# Patient Record
Sex: FEMALE | Race: WHITE | NOT HISPANIC OR LATINO | Employment: UNEMPLOYED | URBAN - NONMETROPOLITAN AREA
[De-identification: names, ages, dates, MRNs, and addresses within clinical notes are randomized per-mention and may not be internally consistent; named-entity substitution may affect disease eponyms.]

---
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
## 2017-01-11 ENCOUNTER — ROUTINE PRENATAL (OUTPATIENT)
Dept: OBSTETRICS AND GYNECOLOGY | Facility: CLINIC | Age: 22
End: 2017-01-11

## 2017-01-11 VITALS — WEIGHT: 171 LBS | SYSTOLIC BLOOD PRESSURE: 104 MMHG | BODY MASS INDEX: 32.31 KG/M2 | DIASTOLIC BLOOD PRESSURE: 62 MMHG

## 2017-01-11 DIAGNOSIS — Z34.82 ENCOUNTER FOR SUPERVISION OF OTHER NORMAL PREGNANCY IN SECOND TRIMESTER: Primary | ICD-10-CM

## 2017-01-11 PROBLEM — Z34.92 ENCOUNTER FOR SUPERVISION OF NORMAL PREGNANCY IN SECOND TRIMESTER: Status: ACTIVE | Noted: 2017-01-11

## 2017-01-11 PROCEDURE — 99213 OFFICE O/P EST LOW 20 MIN: CPT | Performed by: OBSTETRICS & GYNECOLOGY

## 2017-01-11 NOTE — MR AVS SNAPSHOT
Maida D Day   2017 9:30 AM   Routine Prenatal    Dept Phone:  [phone]   Encounter #:  [id number]    Provider:  Frantz Gonzales MD   Department:  [facility]                Your Full Care Plan              Your Updated Medication List          This list is accurate as of: 17 10:03 AM.  Always use your most recent med list.                PRENATAL 1 PO               We Performed the Following     Maternal Screen 4       You Were Diagnosed With        Codes Comments    Encounter for supervision of other normal pregnancy in second trimester    -  Primary ICD-10-CM: Z34.82       Instructions     None    Patient Instructions History      Upcoming Appointments     Visit Type Date Time Department    OB FOLLOWUP 2017  9:30 AM [facility]    OB FOLLOWUP 2017  9:15 AM [facility] [place] [facility]      [employer] Signup     [facility] [employer] allows you to send messages to your doctor, view your test results, renew your prescriptions, schedule appointments, and more. To sign up, go to [employer] and click on the Sign Up Now link in the New User? box. Enter your [employer] Activation Code exactly as it appears below along with the last four digits of your Social Security Number and your Date of Birth () to complete the sign-up process. If you do not sign up before the expiration date, you must request a new code.    [employer] Activation Code: [email or web address]  Expires: 2017 10:45 AM    If you have questions, you can email [email or web address] or call [phone] to talk to our [employer] staff. Remember, [employer] is NOT to be used for urgent needs. For medical emergencies, dial 911.               Other Info from Your Visit           Your Appointments     2017  9:15 AM CST   OB FOLLOWUP with Tess Cruz MD   [facility] (--)    [address] [place] [id number]   [phone]             Allergies     No Known Allergies      Reason for Visit     Routine Prenatal Visit           Vital Signs     Blood Pressure Weight Last Menstrual Period Body Mass Index Smoking Status       104/62 (43 %/ 52 %)* 171 lb (77.6 kg) (92 %, Z= 1.40)† 09/13/2016 32.31 kg/m2 (95 %, Z= 1.69)† Former Smoker     *BP percentiles are based on NHBPEP's 4th Report    †Growth percentiles are based on CDC 2-20 Years data.      Problems and Diagnoses Noted     Prenatal care

## 2017-01-13 LAB
2ND TRIMESTER 4 SCREEN SERPL-IMP: NORMAL
2ND TRIMESTER 4 SCREEN SERPL-IMP: NORMAL
AFP ADJ MOM SERPL: 1.26
AFP SERPL-MCNC: 42.9 NG/ML
AGE AT DELIVERY: 20.3 YEARS
FET TS 18 RISK FROM MAT AGE: NORMAL
FET TS 21 RISK FROM MAT AGE: 1159
GA METHOD: NORMAL
GA: 17.1 WEEKS
HCG ADJ MOM SERPL: 0.32
HCG SERPL-ACNC: 9395 MIU/ML
IDDM PATIENT QL: NO
INHIBIN A ADJ MOM SERPL: 0.63
INHIBIN A SERPL-MCNC: 100.59 PG/ML
LABORATORY COMMENT REPORT: NORMAL
Lab: NORMAL
MULTIPLE PREGNANCY: NO
NEURAL TUBE DEFECT RISK FETUS: 5411 %
RESULT: NORMAL
TS 18 RISK FETUS: NORMAL
TS 21 RISK FETUS: NORMAL
U ESTRIOL ADJ MOM SERPL: 0.82
U ESTRIOL SERPL-MCNC: 0.9 NG/ML

## 2017-02-08 ENCOUNTER — ROUTINE PRENATAL (OUTPATIENT)
Dept: OBSTETRICS AND GYNECOLOGY | Facility: CLINIC | Age: 22
End: 2017-02-08

## 2017-02-08 VITALS — BODY MASS INDEX: 32.5 KG/M2 | DIASTOLIC BLOOD PRESSURE: 70 MMHG | WEIGHT: 172 LBS | SYSTOLIC BLOOD PRESSURE: 108 MMHG

## 2017-02-08 DIAGNOSIS — Z78.9 NONSMOKER: Primary | ICD-10-CM

## 2017-02-08 DIAGNOSIS — Z34.82 PRENATAL CARE, SUBSEQUENT PREGNANCY, SECOND TRIMESTER: ICD-10-CM

## 2017-02-08 PROCEDURE — 99212 OFFICE O/P EST SF 10 MIN: CPT | Performed by: OBSTETRICS & GYNECOLOGY

## 2017-02-08 NOTE — PROGRESS NOTES
Nonsmoker.  Patient feeling better, HA's and dizziness are both gone.  No problems or concerns.  Patient has to go back to [facility] to finish anatomy scan because there were structures not visualized well (no report available yet).

## 2017-03-08 ENCOUNTER — ROUTINE PRENATAL (OUTPATIENT)
Dept: OBSTETRICS AND GYNECOLOGY | Facility: CLINIC | Age: 22
End: 2017-03-08

## 2017-03-08 VITALS — BODY MASS INDEX: 32.69 KG/M2 | WEIGHT: 173 LBS | DIASTOLIC BLOOD PRESSURE: 70 MMHG | SYSTOLIC BLOOD PRESSURE: 128 MMHG

## 2017-03-08 DIAGNOSIS — Z34.82 PRENATAL CARE, SUBSEQUENT PREGNANCY, SECOND TRIMESTER: ICD-10-CM

## 2017-03-08 DIAGNOSIS — Z78.9 NONSMOKER: Primary | ICD-10-CM

## 2017-03-08 PROCEDURE — 99212 OFFICE O/P EST SF 10 MIN: CPT | Performed by: OBSTETRICS & GYNECOLOGY

## 2017-03-08 NOTE — PROGRESS NOTES
Occasional cramping, never any bleeding.  Good FM.  Pt reports remainder of MFM scan was normal.  GCT and TDaP next visit.

## 2017-03-29 ENCOUNTER — ROUTINE PRENATAL (OUTPATIENT)
Dept: OBSTETRICS AND GYNECOLOGY | Facility: CLINIC | Age: 22
End: 2017-03-29

## 2017-03-29 VITALS — SYSTOLIC BLOOD PRESSURE: 130 MMHG | DIASTOLIC BLOOD PRESSURE: 70 MMHG | BODY MASS INDEX: 33.82 KG/M2 | WEIGHT: 179 LBS

## 2017-03-29 DIAGNOSIS — Z78.9 NONSMOKER: Primary | ICD-10-CM

## 2017-03-29 DIAGNOSIS — Z34.82 PRENATAL CARE, SUBSEQUENT PREGNANCY, SECOND TRIMESTER: ICD-10-CM

## 2017-03-29 LAB
GLUCOSE 1H P 50 G GLC PO SERPL-MCNC: 85 MG/DL (ref 70–140)
HGB BLD-MCNC: 11.2 G/DL (ref 12–16)

## 2017-03-29 PROCEDURE — 99212 OFFICE O/P EST SF 10 MIN: CPT | Performed by: OBSTETRICS & GYNECOLOGY

## 2017-03-29 PROCEDURE — 90715 TDAP VACCINE 7 YRS/> IM: CPT | Performed by: OBSTETRICS & GYNECOLOGY

## 2017-03-29 PROCEDURE — 90471 IMMUNIZATION ADMIN: CPT | Performed by: OBSTETRICS & GYNECOLOGY

## 2017-03-29 NOTE — PROGRESS NOTES
Pt c/o swelling in hands and feet.  She also reports feeling a little more tired than usual.  Good FM.  GCT & TDaP today.

## 2017-04-12 ENCOUNTER — ROUTINE PRENATAL (OUTPATIENT)
Dept: OBSTETRICS AND GYNECOLOGY | Facility: CLINIC | Age: 22
End: 2017-04-12

## 2017-04-12 VITALS — DIASTOLIC BLOOD PRESSURE: 70 MMHG | SYSTOLIC BLOOD PRESSURE: 120 MMHG | BODY MASS INDEX: 34.2 KG/M2 | WEIGHT: 181 LBS

## 2017-04-12 DIAGNOSIS — Z34.93 PRENATAL CARE, THIRD TRIMESTER: Primary | ICD-10-CM

## 2017-04-12 PROCEDURE — 99213 OFFICE O/P EST LOW 20 MIN: CPT | Performed by: NURSE PRACTITIONER

## 2017-04-12 NOTE — PATIENT INSTRUCTIONS
"Third Trimester of Pregnancy  The third trimester is from week 29 through week 42, months 7 through 9. The third trimester is a time when the fetus is growing rapidly. At the end of the ninth month, the fetus is about 20 inches in length and weighs 6-10 pounds.   BODY CHANGES  Your body goes through many changes during pregnancy. The changes vary from woman to woman.   · Your weight will continue to increase. You can expect to gain 25-35 pounds (11-16 kg) by the end of the pregnancy.  · You may begin to get stretch marks on your hips, abdomen, and breasts.  · You may urinate more often because the fetus is moving lower into your pelvis and pressing on your bladder.  · You may develop or continue to have heartburn as a result of your pregnancy.  · You may develop constipation because certain hormones are causing the muscles that push waste through your intestines to slow down.  · You may develop hemorrhoids or swollen, bulging veins (varicose veins).  · You may have pelvic pain because of the weight gain and pregnancy hormones relaxing your joints between the bones in your pelvis. Backaches may result from overexertion of the muscles supporting your posture.  · You may have changes in your hair. These can include thickening of your hair, rapid growth, and changes in texture. Some women also have hair loss during or after pregnancy, or hair that feels dry or thin. Your hair will most likely return to normal after your baby is born.  · Your breasts will continue to grow and be tender. A yellow discharge may leak from your breasts called colostrum.  · Your belly button may stick out.  · You may feel short of breath because of your expanding uterus.  · You may notice the fetus \"dropping,\" or moving lower in your abdomen.  · You may have a bloody mucus discharge. This usually occurs a few days to a week before labor begins.  · Your cervix becomes thin and soft (effaced) near your due date.  WHAT TO EXPECT AT YOUR PRENATAL " EXAMS   You will have prenatal exams every 2 weeks until week 36. Then, you will have weekly prenatal exams. During a routine prenatal visit:  · You will be weighed to make sure you and the fetus are growing normally.  · Your blood pressure is taken.  · Your abdomen will be measured to track your baby's growth.  · The fetal heartbeat will be listened to.  · Any test results from the previous visit will be discussed.  · You may have a cervical check near your due date to see if you have effaced.  At around 36 weeks, your caregiver will check your cervix. At the same time, your caregiver will also perform a test on the secretions of the vaginal tissue. This test is to determine if a type of bacteria, Group B streptococcus, is present. Your caregiver will explain this further.  Your caregiver may ask you:  · What your birth plan is.  · How you are feeling.  · If you are feeling the baby move.  · If you have had any abnormal symptoms, such as leaking fluid, bleeding, severe headaches, or abdominal cramping.  · If you are using any tobacco products, including cigarettes, chewing tobacco, and electronic cigarettes.  · If you have any questions.  Other tests or screenings that may be performed during your third trimester include:  · Blood tests that check for low iron levels (anemia).  · Fetal testing to check the health, activity level, and growth of the fetus. Testing is done if you have certain medical conditions or if there are problems during the pregnancy.  · HIV (human immunodeficiency virus) testing. If you are at high risk, you may be screened for HIV during your third trimester of pregnancy.  FALSE LABOR  You may feel small, irregular contractions that eventually go away. These are called Dayday Hyman contractions, or false labor. Contractions may last for hours, days, or even weeks before true labor sets in. If contractions come at regular intervals, intensify, or become painful, it is best to be seen by your  caregiver.   SIGNS OF LABOR   · Menstrual-like cramps.  · Contractions that are 5 minutes apart or less.  · Contractions that start on the top of the uterus and spread down to the lower abdomen and back.  · A sense of increased pelvic pressure or back pain.  · A watery or bloody mucus discharge that comes from the vagina.  If you have any of these signs before the 37th week of pregnancy, call your caregiver right away. You need to go to the hospital to get checked immediately.  HOME CARE INSTRUCTIONS   · Avoid all smoking, herbs, alcohol, and unprescribed drugs. These chemicals affect the formation and growth of the baby.  · Do not use any tobacco products, including cigarettes, chewing tobacco, and electronic cigarettes. If you need help quitting, ask your health care provider. You may receive counseling support and other resources to help you quit.  · Follow your caregiver's instructions regarding medicine use. There are medicines that are either safe or unsafe to take during pregnancy.  · Exercise only as directed by your caregiver. Experiencing uterine cramps is a good sign to stop exercising.  · Continue to eat regular, healthy meals.  · Wear a good support bra for breast tenderness.  · Do not use hot tubs, steam rooms, or saunas.  · Wear your seat belt at all times when driving.  · Avoid raw meat, uncooked cheese, cat litter boxes, and soil used by cats. These carry germs that can cause birth defects in the baby.  · Take your prenatal vitamins.  · Take 1874-7777 mg of calcium daily starting at the 20th week of pregnancy until you deliver your baby.  · Try taking a stool softener (if your caregiver approves) if you develop constipation. Eat more high-fiber foods, such as fresh vegetables or fruit and whole grains. Drink plenty of fluids to keep your urine clear or pale yellow.  · Take warm sitz baths to soothe any pain or discomfort caused by hemorrhoids. Use hemorrhoid cream if your caregiver approves.  · If  you develop varicose veins, wear support hose. Elevate your feet for 15 minutes, 3-4 times a day. Limit salt in your diet.  · Avoid heavy lifting, wear low heal shoes, and practice good posture.  · Rest a lot with your legs elevated if you have leg cramps or low back pain.  · Visit your dentist if you have not gone during your pregnancy. Use a soft toothbrush to brush your teeth and be gentle when you floss.  · A sexual relationship may be continued unless your caregiver directs you otherwise.  · Do not travel far distances unless it is absolutely necessary and only with the approval of your caregiver.  · Take prenatal classes to understand, practice, and ask questions about the labor and delivery.  · Make a trial run to the hospital.  · Pack your hospital bag.  · Prepare the baby's nursery.  · Continue to go to all your prenatal visits as directed by your caregiver.  SEEK MEDICAL CARE IF:  · You are unsure if you are in labor or if your water has broken.  · You have dizziness.  · You have mild pelvic cramps, pelvic pressure, or nagging pain in your abdominal area.  · You have persistent nausea, vomiting, or diarrhea.  · You have a bad smelling vaginal discharge.  · You have pain with urination.  SEEK IMMEDIATE MEDICAL CARE IF:   · You have a fever.  · You are leaking fluid from your vagina.  · You have spotting or bleeding from your vagina.  · You have severe abdominal cramping or pain.  · You have rapid weight loss or gain.  · You have shortness of breath with chest pain.  · You notice sudden or extreme swelling of your face, hands, ankles, feet, or legs.  · You have not felt your baby move in over an hour.  · You have severe headaches that do not go away with medicine.  · You have vision changes.     This information is not intended to replace advice given to you by your health care provider. Make sure you discuss any questions you have with your health care provider.     Document Released: 12/12/2002 Document  Revised: 01/08/2016 Document Reviewed: 02/18/2014  Elsevier Interactive Patient Education ©2016 Elsevier Inc.

## 2017-04-12 NOTE — PROGRESS NOTES
Reviewed previous labs Hg slightly low. Encouraged iron supplement. Occ contraction. No reactions to TDap.  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

## 2017-04-27 ENCOUNTER — ROUTINE PRENATAL (OUTPATIENT)
Dept: OBSTETRICS AND GYNECOLOGY | Facility: CLINIC | Age: 22
End: 2017-04-27

## 2017-04-27 VITALS — SYSTOLIC BLOOD PRESSURE: 120 MMHG | BODY MASS INDEX: 34.39 KG/M2 | WEIGHT: 182 LBS | DIASTOLIC BLOOD PRESSURE: 74 MMHG

## 2017-04-27 DIAGNOSIS — Z34.82 ENCOUNTER FOR SUPERVISION OF OTHER NORMAL PREGNANCY IN SECOND TRIMESTER: Primary | ICD-10-CM

## 2017-04-27 PROCEDURE — 99213 OFFICE O/P EST LOW 20 MIN: CPT | Performed by: OBSTETRICS & GYNECOLOGY

## 2017-04-27 NOTE — PROGRESS NOTES
Good fetal movement  Feeling well, no contractions  Abdomen nontender, no edema   labor precautions

## 2017-05-10 ENCOUNTER — ROUTINE PRENATAL (OUTPATIENT)
Dept: OBSTETRICS AND GYNECOLOGY | Facility: CLINIC | Age: 22
End: 2017-05-10

## 2017-05-10 VITALS — DIASTOLIC BLOOD PRESSURE: 70 MMHG | SYSTOLIC BLOOD PRESSURE: 115 MMHG | WEIGHT: 184 LBS | BODY MASS INDEX: 34.77 KG/M2

## 2017-05-10 DIAGNOSIS — Z34.93 PRENATAL CARE, THIRD TRIMESTER: Primary | ICD-10-CM

## 2017-05-10 DIAGNOSIS — Z78.9 NONSMOKER: ICD-10-CM

## 2017-05-10 PROCEDURE — 99213 OFFICE O/P EST LOW 20 MIN: CPT | Performed by: OBSTETRICS & GYNECOLOGY

## 2017-05-23 LAB
EXTERNAL CHLAMYDIA SCREEN: NEGATIVE
EXTERNAL GONORRHEA SCREEN: NEGATIVE

## 2017-05-24 ENCOUNTER — ROUTINE PRENATAL (OUTPATIENT)
Dept: OBSTETRICS AND GYNECOLOGY | Facility: CLINIC | Age: 22
End: 2017-05-24

## 2017-05-24 VITALS — BODY MASS INDEX: 35.52 KG/M2 | SYSTOLIC BLOOD PRESSURE: 120 MMHG | DIASTOLIC BLOOD PRESSURE: 82 MMHG | WEIGHT: 188 LBS

## 2017-05-24 DIAGNOSIS — Z34.83 PRENATAL CARE, SUBSEQUENT PREGNANCY, THIRD TRIMESTER: Primary | ICD-10-CM

## 2017-05-24 DIAGNOSIS — Z78.9 NONSMOKER: ICD-10-CM

## 2017-05-24 LAB — EXTERNAL GROUP B STREP ANTIGEN: NEGATIVE

## 2017-05-24 PROCEDURE — 99213 OFFICE O/P EST LOW 20 MIN: CPT | Performed by: OBSTETRICS & GYNECOLOGY

## 2017-05-26 LAB
C TRACH RRNA SPEC QL NAA+PROBE: NEGATIVE
GP B STREP DNA SPEC QL NAA+PROBE: NEGATIVE
N GONORRHOEA RRNA SPEC QL NAA+PROBE: NEGATIVE

## 2017-05-31 ENCOUNTER — ROUTINE PRENATAL (OUTPATIENT)
Dept: OBSTETRICS AND GYNECOLOGY | Facility: CLINIC | Age: 22
End: 2017-05-31

## 2017-05-31 VITALS — DIASTOLIC BLOOD PRESSURE: 70 MMHG | WEIGHT: 189 LBS | BODY MASS INDEX: 35.71 KG/M2 | SYSTOLIC BLOOD PRESSURE: 112 MMHG

## 2017-05-31 DIAGNOSIS — Z34.83 PRENATAL CARE, SUBSEQUENT PREGNANCY, THIRD TRIMESTER: Primary | ICD-10-CM

## 2017-05-31 DIAGNOSIS — Z78.9 NONSMOKER: ICD-10-CM

## 2017-05-31 PROCEDURE — 99212 OFFICE O/P EST SF 10 MIN: CPT | Performed by: OBSTETRICS & GYNECOLOGY

## 2017-06-07 ENCOUNTER — ROUTINE PRENATAL (OUTPATIENT)
Dept: OBSTETRICS AND GYNECOLOGY | Facility: CLINIC | Age: 22
End: 2017-06-07

## 2017-06-07 VITALS — DIASTOLIC BLOOD PRESSURE: 72 MMHG | SYSTOLIC BLOOD PRESSURE: 120 MMHG | WEIGHT: 188 LBS | BODY MASS INDEX: 35.52 KG/M2

## 2017-06-07 DIAGNOSIS — Z34.93 PRENATAL CARE, THIRD TRIMESTER: ICD-10-CM

## 2017-06-07 DIAGNOSIS — Z78.9 NONSMOKER: Primary | ICD-10-CM

## 2017-06-07 PROCEDURE — 99212 OFFICE O/P EST SF 10 MIN: CPT | Performed by: OBSTETRICS & GYNECOLOGY

## 2017-06-10 ENCOUNTER — HOSPITAL ENCOUNTER (INPATIENT)
Facility: HOSPITAL | Age: 22
LOS: 2 days | Discharge: HOME | End: 2017-06-12
Attending: OBSTETRICS & GYNECOLOGY | Admitting: OBSTETRICS & GYNECOLOGY

## 2017-06-10 PROBLEM — O42.92 FULL-TERM PREMATURE RUPTURE OF MEMBRANES: Status: ACTIVE | Noted: 2017-06-10

## 2017-06-10 PROBLEM — Z34.90 NORMAL PREGNANCY: Status: ACTIVE | Noted: 2017-06-10

## 2017-06-10 PROBLEM — Z37.9 NORMAL LABOR: Status: ACTIVE | Noted: 2017-06-10

## 2017-06-10 LAB
A1 MICROGLOB PLACENTAL VAG QL: NEGATIVE
A1 MICROGLOB PLACENTAL VAG QL: POSITIVE
ABO GROUP BLD: NORMAL
BLD GP AB SCN SERPL QL: NEGATIVE
DEPRECATED RDW RBC AUTO: 42.7 FL (ref 40–54)
ERYTHROCYTE [DISTWIDTH] IN BLOOD BY AUTOMATED COUNT: 14.6 % (ref 12–15)
HCT VFR BLD AUTO: 38.1 % (ref 37–47)
HGB BLD-MCNC: 12.2 G/DL (ref 12–16)
MCH RBC QN AUTO: 26 PG (ref 28–32)
MCHC RBC AUTO-ENTMCNC: 32 G/DL (ref 33–36)
MCV RBC AUTO: 81.2 FL (ref 82–98)
PLATELET # BLD AUTO: 224 10*3/MM3 (ref 130–400)
PMV BLD AUTO: 13.5 FL (ref 6–12)
RBC # BLD AUTO: 4.69 10*6/MM3 (ref 4.2–5.4)
RH BLD: POSITIVE
WBC NRBC COR # BLD: 10.52 10*3/MM3 (ref 4.8–10.8)

## 2017-06-10 PROCEDURE — 86850 RBC ANTIBODY SCREEN: CPT | Performed by: OBSTETRICS & GYNECOLOGY

## 2017-06-10 PROCEDURE — 84112 EVAL AMNIOTIC FLUID PROTEIN: CPT | Performed by: OBSTETRICS & GYNECOLOGY

## 2017-06-10 PROCEDURE — 25010000002 BUTORPHANOL PER 1 MG: Performed by: OBSTETRICS & GYNECOLOGY

## 2017-06-10 PROCEDURE — 86901 BLOOD TYPING SEROLOGIC RH(D): CPT | Performed by: OBSTETRICS & GYNECOLOGY

## 2017-06-10 PROCEDURE — 25010000002 PENICILLIN G POTASSIUM PER 600000 UNITS: Performed by: OBSTETRICS & GYNECOLOGY

## 2017-06-10 PROCEDURE — 86900 BLOOD TYPING SEROLOGIC ABO: CPT | Performed by: OBSTETRICS & GYNECOLOGY

## 2017-06-10 PROCEDURE — 85027 COMPLETE CBC AUTOMATED: CPT | Performed by: OBSTETRICS & GYNECOLOGY

## 2017-06-10 RX ORDER — CARBOPROST TROMETHAMINE 250 UG/ML
250 INJECTION, SOLUTION INTRAMUSCULAR ONCE
Status: DISCONTINUED | OUTPATIENT
Start: 2017-06-11 | End: 2017-06-11

## 2017-06-10 RX ORDER — OXYTOCIN 10 [USP'U]/ML
10 INJECTION, SOLUTION INTRAMUSCULAR; INTRAVENOUS ONCE
Status: COMPLETED | OUTPATIENT
Start: 2017-06-10 | End: 2017-06-10

## 2017-06-10 RX ORDER — IBUPROFEN 800 MG/1
800 TABLET ORAL EVERY 6 HOURS PRN
Status: DISCONTINUED | OUTPATIENT
Start: 2017-06-10 | End: 2017-06-12 | Stop reason: HOSPADM

## 2017-06-10 RX ORDER — OXYTOCIN/0.9 % SODIUM CHLORIDE 30/500 ML
2 PLASTIC BAG, INJECTION (ML) INTRAVENOUS
Status: DISCONTINUED | OUTPATIENT
Start: 2017-06-10 | End: 2017-06-10 | Stop reason: SDUPTHER

## 2017-06-10 RX ORDER — ONDANSETRON 4 MG/1
4 TABLET, FILM COATED ORAL EVERY 8 HOURS PRN
Status: DISCONTINUED | OUTPATIENT
Start: 2017-06-10 | End: 2017-06-12 | Stop reason: HOSPADM

## 2017-06-10 RX ORDER — HYDROCODONE BITARTRATE AND ACETAMINOPHEN 5; 325 MG/1; MG/1
1 TABLET ORAL EVERY 4 HOURS PRN
Status: DISCONTINUED | OUTPATIENT
Start: 2017-06-10 | End: 2017-06-10 | Stop reason: HOSPADM

## 2017-06-10 RX ORDER — DOCUSATE SODIUM 100 MG/1
100 CAPSULE, LIQUID FILLED ORAL 2 TIMES DAILY
Status: DISCONTINUED | OUTPATIENT
Start: 2017-06-11 | End: 2017-06-12 | Stop reason: HOSPADM

## 2017-06-10 RX ORDER — BISACODYL 10 MG
10 SUPPOSITORY, RECTAL RECTAL DAILY PRN
Status: DISCONTINUED | OUTPATIENT
Start: 2017-06-11 | End: 2017-06-12 | Stop reason: HOSPADM

## 2017-06-10 RX ORDER — LIDOCAINE HYDROCHLORIDE 10 MG/ML
5 INJECTION, SOLUTION INFILTRATION; PERINEURAL AS NEEDED
Status: DISCONTINUED | OUTPATIENT
Start: 2017-06-10 | End: 2017-06-10

## 2017-06-10 RX ORDER — IBUPROFEN 600 MG/1
600 TABLET ORAL EVERY 6 HOURS PRN
Status: DISCONTINUED | OUTPATIENT
Start: 2017-06-10 | End: 2017-06-10 | Stop reason: HOSPADM

## 2017-06-10 RX ORDER — OXYTOCIN/RINGER'S LACTATE 20/1000 ML
999 PLASTIC BAG, INJECTION (ML) INTRAVENOUS ONCE
Status: DISCONTINUED | OUTPATIENT
Start: 2017-06-10 | End: 2017-06-10

## 2017-06-10 RX ORDER — OXYTOCIN/0.9 % SODIUM CHLORIDE 30/500 ML
2-30 PLASTIC BAG, INJECTION (ML) INTRAVENOUS
Status: DISCONTINUED | OUTPATIENT
Start: 2017-06-10 | End: 2017-06-10 | Stop reason: HOSPADM

## 2017-06-10 RX ORDER — OXYTOCIN/RINGER'S LACTATE 20/1000 ML
PLASTIC BAG, INJECTION (ML) INTRAVENOUS
Status: DISCONTINUED
Start: 2017-06-10 | End: 2017-06-11 | Stop reason: WASHOUT

## 2017-06-10 RX ORDER — OXYTOCIN/0.9 % SODIUM CHLORIDE 30/500 ML
2-30 PLASTIC BAG, INJECTION (ML) INTRAVENOUS
Status: DISCONTINUED | OUTPATIENT
Start: 2017-06-10 | End: 2017-06-10

## 2017-06-10 RX ORDER — CARBOPROST TROMETHAMINE 250 UG/ML
250 INJECTION, SOLUTION INTRAMUSCULAR AS NEEDED
Status: DISCONTINUED | OUTPATIENT
Start: 2017-06-10 | End: 2017-06-10 | Stop reason: HOSPADM

## 2017-06-10 RX ORDER — OXYTOCIN 10 [USP'U]/ML
INJECTION, SOLUTION INTRAMUSCULAR; INTRAVENOUS
Status: COMPLETED
Start: 2017-06-10 | End: 2017-06-10

## 2017-06-10 RX ORDER — OXYCODONE HYDROCHLORIDE AND ACETAMINOPHEN 5; 325 MG/1; MG/1
1 TABLET ORAL EVERY 4 HOURS PRN
Status: DISCONTINUED | OUTPATIENT
Start: 2017-06-10 | End: 2017-06-12 | Stop reason: HOSPADM

## 2017-06-10 RX ORDER — SODIUM CHLORIDE 0.9 % (FLUSH) 0.9 %
1-10 SYRINGE (ML) INJECTION AS NEEDED
Status: DISCONTINUED | OUTPATIENT
Start: 2017-06-10 | End: 2017-06-10

## 2017-06-10 RX ORDER — MISOPROSTOL 200 UG/1
600 TABLET ORAL ONCE
Status: DISCONTINUED | OUTPATIENT
Start: 2017-06-11 | End: 2017-06-11

## 2017-06-10 RX ORDER — PENICILLIN G 3000000 [IU]/50ML
3 INJECTION, SOLUTION INTRAVENOUS EVERY 4 HOURS
Status: DISCONTINUED | OUTPATIENT
Start: 2017-06-10 | End: 2017-06-10

## 2017-06-10 RX ORDER — METHYLERGONOVINE MALEATE 0.2 MG/ML
200 INJECTION INTRAVENOUS ONCE AS NEEDED
Status: DISCONTINUED | OUTPATIENT
Start: 2017-06-10 | End: 2017-06-10 | Stop reason: HOSPADM

## 2017-06-10 RX ORDER — OXYTOCIN/RINGER'S LACTATE 20/1000 ML
125 PLASTIC BAG, INJECTION (ML) INTRAVENOUS AS NEEDED
Status: DISCONTINUED | OUTPATIENT
Start: 2017-06-10 | End: 2017-06-10

## 2017-06-10 RX ORDER — METHYLERGONOVINE MALEATE 0.2 MG/ML
200 INJECTION INTRAVENOUS ONCE
Status: DISCONTINUED | OUTPATIENT
Start: 2017-06-11 | End: 2017-06-11

## 2017-06-10 RX ORDER — ACETAMINOPHEN 325 MG/1
650 TABLET ORAL EVERY 4 HOURS PRN
Status: DISCONTINUED | OUTPATIENT
Start: 2017-06-10 | End: 2017-06-12 | Stop reason: HOSPADM

## 2017-06-10 RX ORDER — BUTORPHANOL TARTRATE 1 MG/ML
1 INJECTION, SOLUTION INTRAMUSCULAR; INTRAVENOUS
Status: DISCONTINUED | OUTPATIENT
Start: 2017-06-10 | End: 2017-06-10

## 2017-06-10 RX ORDER — LIDOCAINE HYDROCHLORIDE 20 MG/ML
INJECTION, SOLUTION INFILTRATION; PERINEURAL
Status: DISCONTINUED
Start: 2017-06-10 | End: 2017-06-10 | Stop reason: WASHOUT

## 2017-06-10 RX ORDER — MISOPROSTOL 200 UG/1
800 TABLET ORAL AS NEEDED
Status: DISCONTINUED | OUTPATIENT
Start: 2017-06-10 | End: 2017-06-10 | Stop reason: HOSPADM

## 2017-06-10 RX ADMIN — SODIUM CHLORIDE, POTASSIUM CHLORIDE, SODIUM LACTATE AND CALCIUM CHLORIDE 1000 ML: 600; 310; 30; 20 INJECTION, SOLUTION INTRAVENOUS at 16:57

## 2017-06-10 RX ADMIN — PENICILLIN G 3 MILLION UNITS: 3000000 INJECTION, SOLUTION INTRAVENOUS at 21:01

## 2017-06-10 RX ADMIN — PENICILLIN G POTASSIUM 5 MILLION UNITS: 5000000 POWDER, FOR SOLUTION INTRAMUSCULAR; INTRAPLEURAL; INTRATHECAL; INTRAVENOUS at 16:57

## 2017-06-10 RX ADMIN — OXYTOCIN 10 UNITS: 10 INJECTION, SOLUTION INTRAMUSCULAR; INTRAVENOUS at 21:34

## 2017-06-10 RX ADMIN — BUTORPHANOL TARTRATE 1 MG: 1 INJECTION, SOLUTION INTRAMUSCULAR; INTRAVENOUS at 20:06

## 2017-06-10 RX ADMIN — OXYTOCIN-SODIUM CHLORIDE 0.9% IV SOLN 30 UNIT/500ML 2 MILLI-UNITS/MIN: 30-0.9/5 SOLUTION at 19:38

## 2017-06-10 RX ADMIN — OXYTOCIN 10 UNITS: 10 INJECTION INTRAVENOUS at 21:34

## 2017-06-10 RX ADMIN — BUTORPHANOL TARTRATE 1 MG: 1 INJECTION, SOLUTION INTRAMUSCULAR; INTRAVENOUS at 21:06

## 2017-06-10 NOTE — PROGRESS NOTES
"[facility]  Obstetric Progress Note    Subjective     Patient:    The patient feels uncomfortable with contractions  Declines epidural but would like some IV pain  Medication.    Objective     Vital Signs Range for the last 24 hours  Temp:  [96.7 °F (35.9 °C)] 96.7 °F (35.9 °C)   Temp src: Temporal Artery    BP: (119-122)/(67-76) 122/76   Heart Rate:  [101-116] 101   Resp:  [20] 20               Weight:  [87.1 kg (192 lb)] 87.1 kg (192 lb)       Flowsheet Rows         First Filed Value    Admission Height  61\" (154.9 cm) Documented at 06/10/2017 1330    Admission Weight  87.1 kg (192 lb) Documented at 06/10/2017 1330          Presentation: Vertex   Cervix: Exam by:  Chrissy   Dilation:  3-4   Effacement:  100   Station:  -2         Amniotomy with clear fluid    Fetal Heart Rate Assessment   Method: Fetal HR Assessment Method: external   Beats/min: Fetal HR (Beats/Min): 135   Baseline: Fetal HR Baseline: normal range (110-160 bpm)   Varibility: Fetal HR Variability: moderate (amplitude range 6 to 25 bpm)   Accels: Fetal HR Accelerations: greater than/equal to 15 bpm   Decels: Fetal HR Decelerations: absent   Tracing Category: Fetal HR Tracing Category: Category I     Uterine Assessment   Method: Method: TOCO (external toco transducer)   Frequency (min): Contraction Frequency (min): 2-4   Ctx Count in 10 min:     Duration: Contraction Duration (sec): 20-60   Intensity: Contraction Intensity: mild by palpation   Intensity by IUPC:     Resting Tone: Uterine Resting Tone: soft by palpation   Resting Tone by IUPC:     [place] Units:               Assessment:  1.  Intrauterine pregnancy at 38w4d weeks gestation with prolong rupture   2.  Reassuring fetal monitering    Plan:  1. IV pain  2. Pitocin augmentation if needed given prolonged rupture      Roberta Lowe DO  6/10/2017  5:45 PM    "

## 2017-06-10 NOTE — H&P
[place]  Obstetric History and Physical    Chief Complaint   Patient presents with   • Rupture of Membranes     pt states leaking fluid since last pm        Subjective     Patient is a 22 y.o. female  currently at 38w4d, who presented to labor and delivery with complaints of leakage of fluid  Since last pm.  Initial amni sure neg, but shortly after patient experience a large gush of fluid with obvious rupture of membranes.    Her prenatal care is benign.  Her previous obstetric/gynecological history is noted for two prior uncomplicated vaginal deliverys    The following portions of the patients history were reviewed and updated as appropriate: current medications, allergies, past medical history, past surgical history, past family history and past social history .       Prenatal Information:  Prenatal Results         1st Trimester Ref. Range Date Time   CBC with auto diff       Rubella IgG  1.57 index Immune >0.99 index 16 0903   Hepatitis B SAg  Negative  Negative 16 0903   RPR  Non Reactive  Non Reactive 16 0903   ABO  B   16 0903   Rh  Positive   16 0903   Anibody Screen  Negative  Negative 16 0903   HIV  Non Reactive  Non Reactive 16 0903   Varicella IgG       Urinalysis with microscopy       Urine Culture       GC/Chlamydia/TV       ThinPrep/Pap       2nd and 3rd Trimester Ref. Range Date Time   Hemoglobin / Hematocrit  38.1 % 37.0 - 47.0 % 06/10/17 1624   Hemoglobin  12.2 g/dL 12.0 - 16.0 g/dL 06/10/17 1624   Group B Strep Culture ^ Negative   17    Glucose Challenge Test 1 Hr       Glucose Fasting       Glucose 1 Hr       Glucose 2 Hr       Glucose 3 Hr       Pre-eclampsia Panel       Risk Screening Ref. Range Date Time   Fetal Fibronectin       Amnisure       Hepatitis C Antibody       Hemoglobin electrophoresis       Cystic Fibrosis       Hemoglobin A1C       MSAFP - 4       NIPT       AFP  42.9 ng/mL ng/mL 17 0900   Parvovirus IgG        Parvovirus IgM       POCT - glucose       [facility]       24 Hour urine - Total protein       24 Hour urine - Creatinine clearance       Urinalysis with microscopy       Urine Culture       Drug Screening Ref. Range Date Time   Amphetamine Screen  Negative ng/mL Wddgbl=3676 ng/mL 12/16/16 0903   Barbiturate Screen  Negative ng/mL Btctmj=768 ng/mL 12/16/16 0903   Benzodiazepine Screen  Negative ng/mL Ufjwyw=815 ng/mL 12/16/16 0903   Methadone Screen  Negative ng/mL Qgpgwl=873 ng/mL 12/16/16 0903   Phencyclidine Screen  Negative ng/mL Cutoff=25 ng/mL 12/16/16 0903   Opiates Screen  Negative ng/mL Vlnnkj=494 ng/mL 12/16/16 0903   THC Screen  Negative ng/mL Cutoff=50 ng/mL 12/16/16 0903   Cocaine Screen  Negative ng/mL Qkobog=903 ng/mL 12/16/16 0903   Propoxyphene Screen  Negative ng/mL Qlndvp=671 ng/mL 12/16/16 0903   Buprenorphine Screen       Methamphetamine Screen       Oxycodone Screen       Tryicyclic Antidepressants Screen              Legend: ^: Historical            View all results for this pregnancy        External Prenatal Results         Pregnancy Outside Results - these were transcribed from office records.  See scanned records for details. Date Time   Hgb      Hct      ABO ^ B  12/16/16    Rh ^ Positive  12/16/16    Antibody Screen ^ Negative  12/16/16    Glucose Fasting GTT      Glucose Tolerance Test 1 hour      Glucose Tolerance Test 3 hour      Gonorrhea (discrete) ^ Negative  05/23/17    Chlamydia (discrete) ^ Negative  05/23/17    RPR ^ Non-Reactive  12/16/16    VDRL      Syphillis Antibody      Rubella ^ Immune  12/16/16    HBsAg ^ Negative  12/16/16    Herpes Simplex Virus PCR      Herpes Simplex VIrus Culture      HIV ^ Negative  12/16/16    Hep C RNA Quant PCR      Hep C Antibody      Urine Drug Screen ^ all negative  12/16/16    AFP      Group B Strep ^ Negative  05/24/17    GBS Susceptibility to Clindamycin      GBS Susceptibility to Eythromycin      Fetal Fibronectin      Genetic Testing,  Maternal Blood             Legend: ^: Historical           Past OB History:     Obstetric History       T2      TAB0   SAB0   E0   M0   L1       # Outcome Date GA Lbr Robin/2nd Weight Sex Delivery Anes PTL Lv   3 Current            2 Term 10/07/15 39w0d  6 lb 13 oz (3090 g) M Vag-Spont Local N       Name: Richard Stewart Term 14 38w0d  5 lb 9 oz (2523 g) F Vag-Spont Local N Y      Name: Omar          Past Medical History: Past Medical History:   Diagnosis Date   • Hypertension     in first pregnancy      Past Surgical History Past Surgical History:   Procedure Laterality Date   • TONSILLECTOMY        Family History: Family History   Problem Relation Age of Onset   • Colon cancer Mother    • Breast cancer Neg Hx    • Ovarian cancer Neg Hx    • Uterine cancer Neg Hx       Social History:  reports that she has quit smoking. She has never used smokeless tobacco.   reports that she does not drink alcohol.   reports that she does not use illicit drugs.        General ROS: Pertinent items are noted in HPI    Objective       Vital Signs Range for the last 24 hours  Temperature: Temp:  [96.7 °F (35.9 °C)] 96.7 °F (35.9 °C)   Temp Source: Temp src: Temporal Artery    BP:     Pulse: Heart Rate:  [116] 116   Respirations: Resp:  [20] 20   SPO2:     O2 Amount (l/min):     O2 Devices     Weight: Weight:  [87.1 kg (192 lb)] 87.1 kg (192 lb)     Physical Examination: General appearance - alert, well appearing, and in no distress    Presentation:    Cervix:    Dilation:    Effacement:     Station:         Fetal Heart Rate Assessment   Method: Fetal HR Assessment Method: external   Beats/min: Fetal HR (Beats/Min): 135   Baseline: Fetal HR Baseline: normal range (110-160 bpm)   Varibility: Fetal HR Variability: moderate (amplitude range 6 to 25 bpm)   Accels: Fetal HR Accelerations: greater than/equal to 15 bpm   Decels: Fetal HR Decelerations: absent   Tracing Category: Fetal HR Tracing Category: Category I      Uterine Assessment   Method: Method: TOCO (external toco transducer)   Frequency (min): Contraction Frequency (min): 2-5   Ctx Count in 10 min:     Duration: Contraction Duration (sec): 30-70   Intensity: Contraction Intensity: mild by palpation   Intensity by IUPC:     Resting Tone: Uterine Resting Tone: soft by palpation   Resting Tone by IUPC:     Chris Units:               Assessment:  1.  Intrauterine pregnancy at 38w4d weeks gestation  2.  Rupture of membranes possible > 24 hours    Plan:  1.  Admit  2.  IV antibiotics since possible prolonged rupture of membranes  3.  Pitocin induction  4.  Epidural prn      Roberta Lowe DO  6/10/2017  4:58 PM

## 2017-06-10 NOTE — NURSING NOTE
Went to room to discharge patient and told her the amnisure test was negative.  Patient reported that her pad was wet.  Obtained a nitrazine which was positive and fern which is pending.  Will obtain another amnisure to send to lab.  Dr. Lowe notified.

## 2017-06-11 LAB
HCT VFR BLD AUTO: 35.1 % (ref 37–47)
HGB BLD-MCNC: 11 G/DL (ref 12–16)

## 2017-06-11 PROCEDURE — 85018 HEMOGLOBIN: CPT | Performed by: OBSTETRICS & GYNECOLOGY

## 2017-06-11 PROCEDURE — 85014 HEMATOCRIT: CPT | Performed by: OBSTETRICS & GYNECOLOGY

## 2017-06-11 RX ADMIN — IBUPROFEN 800 MG: 200 TABLET, FILM COATED ORAL at 13:46

## 2017-06-11 RX ADMIN — DOCUSATE SODIUM 100 MG: 100 CAPSULE ORAL at 18:01

## 2017-06-11 RX ADMIN — DOCUSATE SODIUM 100 MG: 100 CAPSULE ORAL at 09:37

## 2017-06-11 RX ADMIN — IBUPROFEN 800 MG: 200 TABLET, FILM COATED ORAL at 21:53

## 2017-06-11 NOTE — PAYOR COMM NOTE
"ADMIT INPT 6-10-17  [id number]      NubiaNory (22 y.o. Female)     Date of Birth Social Security Number Address Home Phone MRN    [id number]  [id number]  [place] [facility] [address] [phone] [id number]    Yarsanism Marital Status          None Single       Admission Date Admission Type Admitting Provider Attending Provider Department, Room/Bed    6/10/17 Elective Roberta Lowe DO Savells, Amber, MD [facility] MOTHER BABY 2A, M223/1    Discharge Date Discharge Disposition Discharge Destination         Home or Self Care             Attending Provider: Tess Cruz MD     Allergies:  No Known Allergies    Isolation:  None   Infection:  None   Code Status:  FULL    Ht:  61\" (154.9 cm)   Wt:  192 lb (87.1 kg)    Admission Cmt:  None   Principal Problem:  None                Active Insurance as of 6/10/2017     Primary Coverage     Payor Plan Insurance Group Employer/Plan Group    [employer] [place] [employer] [place]      Payor Plan Address Payor Plan Phone Number Effective From Effective To    [address] [address] [phone] [address]     [place] [address]       Subscriber Name Subscriber Birth Date Member ID       NORY WILDER 1995 [id number]                 Emergency Contacts      (Rel.) Home Phone Work Phone Mobile Phone    Tabor,Nory (Mother) [phone] -- --               History & Physical      Roberta Lowe DO at 6/10/2017  4:57 PM          [facility]  Obstetric History and Physical    Chief Complaint   Patient presents with   • Rupture of Membranes     pt states leaking fluid since last pm        Subjective     Patient is a 22 y.o. female  currently at 38w4d, who presented to labor and delivery with complaints of leakage of fluid  Since last pm.  Initial amni sure neg, but shortly after patient experience a large gush of fluid with obvious rupture of membranes.    Her prenatal care is benign.  Her previous obstetric/gynecological history is noted " for two prior uncomplicated vaginal deliverys    The following portions of the patients history were reviewed and updated as appropriate: current medications, allergies, past medical history, past surgical history, past family history and past social history .       Prenatal Information:  Prenatal Results         1st Trimester Ref. Range Date Time   CBC with auto diff       Rubella IgG  1.57 index Immune >0.99 index 12/16/16 0903   Hepatitis B SAg  Negative  Negative 12/16/16 0903   RPR  Non Reactive  Non Reactive 12/16/16 0903   ABO  B   12/16/16 0903   Rh  Positive   12/16/16 0903   Anibody Screen  Negative  Negative 12/16/16 0903   HIV  Non Reactive  Non Reactive 12/16/16 0903   Varicella IgG       Urinalysis with microscopy       Urine Culture       GC/Chlamydia/TV       ThinPrep/Pap       2nd and 3rd Trimester Ref. Range Date Time   Hemoglobin / Hematocrit  38.1 % 37.0 - 47.0 % 06/10/17 1624   Hemoglobin  12.2 g/dL 12.0 - 16.0 g/dL 06/10/17 1624   Group B Strep Culture ^ Negative   05/24/17    Glucose Challenge Test 1 Hr       Glucose Fasting       Glucose 1 Hr       Glucose 2 Hr       Glucose 3 Hr       Pre-eclampsia Panel       Risk Screening Ref. Range Date Time   Fetal Fibronectin       Amnisure       Hepatitis C Antibody       Hemoglobin electrophoresis       Cystic Fibrosis       Hemoglobin A1C       MSAFP - 4       NIPT       AFP  42.9 ng/mL ng/mL 01/11/17 0900   Parvovirus IgG       Parvovirus IgM       POCT - glucose       Matt-Sac       24 Hour urine - Total protein       24 Hour urine - Creatinine clearance       Urinalysis with microscopy       Urine Culture       Drug Screening Ref. Range Date Time   Amphetamine Screen  Negative ng/mL Hccgzc=1580 ng/mL 12/16/16 0903   Barbiturate Screen  Negative ng/mL Mwscon=742 ng/mL 12/16/16 0903   Benzodiazepine Screen  Negative ng/mL Mnjurr=336 ng/mL 12/16/16 0903   Methadone Screen  Negative ng/mL Pmgntk=383 ng/mL 12/16/16 0903   Phencyclidine Screen   Negative ng/mL Cutoff=25 ng/mL 16 09   Opiates Screen  Negative ng/mL Arajnm=656 ng/mL 16 09   THC Screen  Negative ng/mL Cutoff=50 ng/mL 16   Cocaine Screen  Negative ng/mL Ncskzi=521 ng/mL 16   Propoxyphene Screen  Negative ng/mL Gskvlh=041 ng/mL 16 09   Buprenorphine Screen       Methamphetamine Screen       Oxycodone Screen       Tryicyclic Antidepressants Screen              Legend: ^: Historical            View all results for this pregnancy        External Prenatal Results         Pregnancy Outside Results - these were transcribed from office records.  See scanned records for details. Date Time   Hgb      Hct      ABO ^ B  16    Rh ^ Positive  16    Antibody Screen ^ Negative  16    Glucose Fasting GTT      Glucose Tolerance Test 1 hour      Glucose Tolerance Test 3 hour      Gonorrhea (discrete) ^ Negative  17    Chlamydia (discrete) ^ Negative  17    RPR ^ Non-Reactive  16    VDRL      Syphillis Antibody      Rubella ^ Immune  16    HBsAg ^ Negative  16    Herpes Simplex Virus PCR      Herpes Simplex VIrus Culture      HIV ^ Negative  16    Hep C RNA Quant PCR      Hep C Antibody      Urine Drug Screen ^ all negative  16    AFP      Group B Strep ^ Negative  17    GBS Susceptibility to Clindamycin      GBS Susceptibility to Eythromycin      Fetal Fibronectin      Genetic Testing, Maternal Blood             Legend: ^: Historical           Past OB History:     Obstetric History       T2      TAB0   SAB0   E0   M0   L1       # Outcome Date GA Lbr Robin/2nd Weight Sex Delivery Anes PTL Lv   3 Current            2 Term 10/07/15 39w0d  6 lb 13 oz (3090 g) M Vag-Spont Local N       Name: Richard    1 Term 14 38w0d  5 lb 9 oz (2523 g) F Vag-Spont Local N Y      Name: Antoniakika          Past Medical History: Past Medical History:   Diagnosis Date   • Hypertension     in first pregnancy       Past Surgical History Past Surgical History:   Procedure Laterality Date   • TONSILLECTOMY        Family History: Family History   Problem Relation Age of Onset   • Colon cancer Mother    • Breast cancer Neg Hx    • Ovarian cancer Neg Hx    • Uterine cancer Neg Hx       Social History:  reports that she has quit smoking. She has never used smokeless tobacco.   reports that she does not drink alcohol.   reports that she does not use illicit drugs.        General ROS: Pertinent items are noted in HPI    Objective       Vital Signs Range for the last 24 hours  Temperature: Temp:  [96.7 °F (35.9 °C)] 96.7 °F (35.9 °C)   Temp Source: Temp src: Temporal Artery    BP:     Pulse: Heart Rate:  [116] 116   Respirations: Resp:  [20] 20   SPO2:     O2 Amount (l/min):     O2 Devices     Weight: Weight:  [87.1 kg (192 lb)] 87.1 kg (192 lb)     Physical Examination: General appearance - alert, well appearing, and in no distress    Presentation:    Cervix:    Dilation:    Effacement:     Station:         Fetal Heart Rate Assessment   Method: Fetal HR Assessment Method: external   Beats/min: Fetal HR (Beats/Min): 135   Baseline: Fetal HR Baseline: normal range (110-160 bpm)   Varibility: Fetal HR Variability: moderate (amplitude range 6 to 25 bpm)   Accels: Fetal HR Accelerations: greater than/equal to 15 bpm   Decels: Fetal HR Decelerations: absent   Tracing Category: Fetal HR Tracing Category: Category I     Uterine Assessment   Method: Method: TOCO (external toco transducer)   Frequency (min): Contraction Frequency (min): 2-5   Ctx Count in 10 min:     Duration: Contraction Duration (sec): 30-70   Intensity: Contraction Intensity: mild by palpation   Intensity by IUPC:     Resting Tone: Uterine Resting Tone: soft by palpation   Resting Tone by IUPC:     [place] Units:               Assessment:  1.  Intrauterine pregnancy at 38w4d weeks gestation  2.  Rupture of membranes possible > 24 hours    Plan:  1.  Admit  2.  IV  antibiotics since possible prolonged rupture of membranes  3.  Pitocin induction  4.  Epidural prn      Roberta Lowe DO  6/10/2017  4:58 PM     Electronically signed by Roberta Lowe DO at 6/10/2017  5:03 PM        Vital Signs (last 7 days)       06/04 0700  -  06/05 0659 06/05 0700  -  06/06 0659 06/06 0700  -  06/07 0659 06/07 0700  -  06/08 0659 06/08 0700  -  06/09 0659 06/09 0700  -  06/10 0659 06/10 0700  -  06/11 0659 06/11 0700  -  06/11 1837   Most Recent    Temp (°F)             96.7 -  98.6    96.9 -  98.7     96.9 (36.1)    Heart Rate             69 -  116    67 -  93     67    Resp             18 -  20    16 -  18     16    BP         120/72        100/67 -  148/78    114/76 -  120/62     120/62    SpO2 (%)             97 -  98    98 -  99     99          Intake & Output (last 7 days)       06/04 0701 - 06/05 0700 06/05 0701 - 06/06 0700 06/06 0701 - 06/07 0700 06/07 0701 - 06/08 0700 06/08 0701 - 06/09 0700 06/09 0701 - 06/10 0700 06/10 0701 - 06/11 0700 06/11 0701 - 06/12 0700    Urine (mL/kg/hr)       400     Blood       100     Total Output             500      Net             -500                      Lines, Drains & Airways    Active LDAs     None         Inactive LDAs     Name:   Placement date:   Placement time:   Removal date:   Removal time:   Site:   Days:    [REMOVED] Peripheral IV Line - Single Lumen 06/10/17 1620 median vein (underside of arm), left 18 gauge  06/10/17    1620    06/10/17    2128      less than 1                Hospital Medications (all)       Dose Frequency Start End    acetaminophen (TYLENOL) tablet 650 mg 650 mg Every 4 Hours PRN 6/10/2017     Sig - Route: Take 2 tablets by mouth Every 4 (Four) Hours As Needed for Mild Pain (1-3). - Oral    benzocaine-lanolin-aloe vera (DERMOPLAST) 20-0.5 % topical spray  As Needed 6/10/2017     Sig - Route: Apply  topically As Needed for Mild Pain (1-3) (perineal pain). - Topical    bisacodyl (DULCOLAX) suppository 10  "mg 10 mg Daily PRN 6/11/2017     Sig - Route: Insert 1 suppository into the rectum Daily As Needed for Constipation. - Rectal    docusate sodium (COLACE) capsule 100 mg 100 mg 2 Times Daily 6/11/2017     Sig - Route: Take 1 capsule by mouth 2 (Two) Times a Day. - Oral    hydrocortisone (ANUSOL-HC) 2.5 % rectal cream 1 application 1 application As Needed 6/10/2017     Sig - Route: Insert 1 application into the rectum As Needed for Hemorrhoids. - Rectal    hydrocortisone-pramoxine (PROCTOFOAM-HS) 1-1 % rectal foam 1 applicator 1 applicator As Needed 6/10/2017     Sig - Route: Insert 1 applicator into the rectum As Needed for Hemorrhoids. - Rectal    ibuprofen (ADVIL,MOTRIN) tablet 800 mg 800 mg Every 6 Hours PRN 6/10/2017 6/20/2017    Sig - Route: Take 1 tablet by mouth Every 6 (Six) Hours As Needed for Mild Pain (1-3). - Oral    lactated ringers bolus 1,000 mL 1,000 mL Once 6/10/2017 6/10/2017    Sig - Route: Infuse 1,000 mL into a venous catheter 1 (One) Time. - Intravenous    magnesium hydroxide (MILK OF MAGNESIA) suspension 2400 mg/10mL 10 mL 10 mL Daily PRN 6/10/2017     Sig - Route: Take 10 mL by mouth Daily As Needed for Constipation. - Oral    ondansetron (ZOFRAN) tablet 4 mg 4 mg Every 8 Hours PRN 6/10/2017     Sig - Route: Take 1 tablet by mouth Every 8 (Eight) Hours As Needed for Nausea or Vomiting. - Oral    oxyCODONE-acetaminophen (PERCOCET) 5-325 MG per tablet 1 tablet 1 tablet Every 4 Hours PRN 6/10/2017 6/20/2017    Sig - Route: Take 1 tablet by mouth Every 4 (Four) Hours As Needed for Moderate Pain (4-6). - Oral    oxytocin (PITOCIN) injection 10 Units 10 Units Once 6/10/2017 6/10/2017    Sig - Route: Inject 1 mL into the shoulder, thigh, or buttocks 1 (One) Time. - Intramuscular    penicillin g 5 mu/100 mL 0.9% NS IVPB (mbp) 5 Million Units Once 6/10/2017 6/10/2017    Sig - Route: Infuse 100 mL into a venous catheter 1 (One) Time. - Intravenous    Linked Group 1:  \"Followed by\" Linked Group Details   "      Tdap (BOOSTRIX) injection 0.5 mL 0.5 mL During Hospitalization 6/10/2017     Sig - Route: Inject 0.5 mL into the shoulder, thigh, or buttocks During Hospitalization for Immunization. - Intramuscular    butorphanol (STADOL) injection 1 mg (Discontinued) 1 mg Every 1 Hour PRN 6/10/2017 6/10/2017    Sig - Route: Infuse 1 mL into a venous catheter Every 1 (One) Hour As Needed for Moderate Pain (4-6). - Intravenous    butorphanol (STADOL) injection 2 mg (Discontinued) 2 mg Every 3 Hours PRN 6/10/2017 6/10/2017    Sig - Route: Infuse 1 mL into a venous catheter Every 3 (Three) Hours As Needed for Moderate Pain (4-6). - Intravenous    Reason for Discontinue: Duplicate order    carboprost (HEMABATE) injection 250 mcg (Discontinued) 250 mcg As Needed 6/10/2017 6/10/2017    Sig - Route: Inject 1 mL into the shoulder, thigh, or buttocks As Needed (hemorrhage). - Intramuscular    Reason for Discontinue: Patient Transfer    Cosign for Ordering: Accepted by Roberta Lowe DO on 6/11/2017  1:54 PM    carboprost (HEMABATE) injection 250 mcg (Discontinued) 250 mcg Once 6/11/2017 6/11/2017    Sig - Route: Inject 1 mL into the shoulder, thigh, or buttocks 1 (One) Time. - Intramuscular    HYDROcodone-acetaminophen (NORCO) 5-325 MG per tablet 1 tablet (Discontinued) 1 tablet Every 4 Hours PRN 6/10/2017 6/10/2017    Sig - Route: Take 1 tablet by mouth Every 4 (Four) Hours As Needed for Moderate Pain (4-6). - Oral    Reason for Discontinue: Patient Transfer    ibuprofen (ADVIL,MOTRIN) tablet 600 mg (Discontinued) 600 mg Every 6 Hours PRN 6/10/2017 6/10/2017    Sig - Route: Take 1 tablet by mouth Every 6 (Six) Hours As Needed for Mild Pain (1-3). - Oral    Reason for Discontinue: Patient Transfer    lidocaine (XYLOCAINE) 1 % injection 5 mL (Discontinued) 5 mL As Needed 6/10/2017 6/10/2017    Sig - Route: Inject 5 mL into the skin As Needed (IV starts). - Intradermal    Cosign for Ordering: Accepted by Roberta Lowe, DO  on 6/10/2017  4:57 PM    lidocaine (XYLOCAINE) 2% injection  - ADS Override Pull (Discontinued)   6/10/2017 6/10/2017    Notes to Pharmacy: Created by cabinet override    Reason for Discontinue: Returned to ADS    methylergonovine (METHERGINE) injection 200 mcg (Discontinued) 200 mcg Once As Needed 6/10/2017 6/10/2017    Sig - Route: Inject 1 mL into the shoulder, thigh, or buttocks 1 (One) Time As Needed (hemorrhage). - Intramuscular    Reason for Discontinue: Patient Transfer    Cosign for Ordering: Accepted by Roberta Lowe DO on 6/11/2017  1:54 PM    methylergonovine (METHERGINE) injection 200 mcg (Discontinued) 200 mcg Once 6/11/2017 6/11/2017    Sig - Route: Inject 1 mL into the shoulder, thigh, or buttocks 1 (One) Time. - Intramuscular    misoprostol (CYTOTEC) tablet 600 mcg (Discontinued) 600 mcg Once 6/11/2017 6/11/2017    Sig - Route: Take 3 tablets by mouth 1 (One) Time. - Oral    misoprostol (CYTOTEC) tablet 800 mcg (Discontinued) 800 mcg As Needed 6/10/2017 6/10/2017    Sig - Route: Insert 4 tablets into the rectum As Needed (Postpartum Hemorrhage). - Rectal    Reason for Discontinue: Patient Transfer    Cosign for Ordering: Accepted by Roberta Lowe DO on 6/11/2017  1:54 PM    oxytocin (PITOCIN) 20 Units in lactated ringers 1,000 mL (0.02 Units/mL) infusion (Discontinued) 2 luca-units/min Titrated 6/10/2017 6/10/2017    Sig - Route: Infuse 2 luca-units/min into a venous catheter Dose Adjusted By Provider As Needed. - Intravenous    Oxytocin-Lactated Ringers (PITOCIN) 20 UNIT/L in lactated Ringer's 1000 mL IVPB  - ADS Override Pull (Discontinued)   6/10/2017 6/11/2017    Notes to Pharmacy: Created by cabinet override    Reason for Discontinue: Returned to ADS    Oxytocin-Lactated Ringers (PITOCIN) 20 UNIT/L in lactated Ringer's 1000 mL IVPB (Discontinued) 999 mL/hr Once 6/10/2017 6/10/2017    Sig - Route: Infuse 19.98 Units/hr into a venous catheter 1 (One) Time. - Intravenous     "Linked Group 2:  \"Followed by\" Linked Group Details        Oxytocin-Lactated Ringers (PITOCIN) 20 UNIT/L in lactated Ringer's 1000 mL IVPB (Discontinued) 125 mL/hr As Needed 6/10/2017 6/10/2017    Sig - Route: Infuse 2.5 Units/hr into a venous catheter As Needed for Bleeding. - Intravenous    Linked Group 2:  \"Followed by\" Linked Group Details        Oxytocin-Sodium Chloride (PITOCIN) 30-0.9 UT/500ML-% infusion solution (Discontinued) 2 luca-units/min Titrated 6/10/2017 6/10/2017    Sig - Route: Infuse 0.002 Units/min into a venous catheter Dose Adjusted By Provider As Needed. - Intravenous    Reason for Discontinue: Duplicate order    Oxytocin-Sodium Chloride (PITOCIN) 30-0.9 UT/500ML-% infusion solution (Discontinued) 2-30 luca-units/min Titrated 6/10/2017 6/10/2017    Sig - Route: Infuse 0.002-0.03 Units/min into a venous catheter Dose Adjusted By Provider As Needed. - Intravenous    Reason for Discontinue: Patient Transfer    Oxytocin-Sodium Chloride (PITOCIN) 30-0.9 UT/500ML-% infusion solution (Discontinued) 2-30 luca-units/min Titrated 6/10/2017 6/10/2017    Sig - Route: Infuse 0.002-0.03 Units/min into a venous catheter Dose Adjusted By Provider As Needed. - Intravenous    penicillin G in iso-osmotic dextrose IVPB 3 million units (premix) (Discontinued) 3 Million Units Every 4 Hours 6/10/2017 6/10/2017    Sig - Route: Infuse 50 mL into a venous catheter Every 4 (Four) Hours. - Intravenous    Linked Group 1:  \"Followed by\" Linked Group Details        sodium chloride 0.9 % flush 1-10 mL (Discontinued) 1-10 mL As Needed 6/10/2017 6/10/2017    Sig - Route: Infuse 1-10 mL into a venous catheter As Needed for Line Care. - Intravenous    Cosign for Ordering: Accepted by Roberta Lowe DO on 6/10/2017  4:57 PM          Lab Results (all)     Procedure Component Value Units Date/Time    PAMG-1, Rapid Assay For ROM [[id number]]  (Normal) Collected:  06/10/17 1326    Specimen:  Amniotic Fluid Updated:  06/10/17 " 1345     Rupture of Membranes Negative    PAMG-1, Rapid Assay For ROM [[id number]]  (Abnormal) Collected:  06/10/17 1500    Specimen:  Amniotic Fluid Updated:  06/10/17 1524     Rupture of Membranes Positive (C)    Group B Streptococcus Culture [[id number]] Resulted:  05/24/17     Specimen:  Swab from Vaginal/Rectum Updated:  06/10/17 1557     External Strep Group B Ag Negative    Chlamydia trachomatis, Neisseria gonorrhoeae, PCR w/ confirmation [[id number]] Resulted:  05/23/17     Specimen:  Swab from Vagina Updated:  06/10/17 1557     External Chlamydia Screen Negative    Gonorrhea Screen [[id number]] Resulted:  05/23/17     Specimen:  Swab Updated:  06/10/17 1557     External Gonorrhea Screen Negative    Urine Drug Screen [[id number]] Resulted:  12/16/16     Specimen:  Urine from Urine, Clean Catch Updated:  06/10/17 1557     External Urine Drug Screen all negative    Hepatitis B Surface Antigen [[id number]] Resulted:  12/16/16     Specimen:  Blood Updated:  06/10/17 1557     External Hepatitis B Surface Ag Negative    RPR [[id number]] Resulted:  12/16/16     Specimen:  Blood Updated:  06/10/17 1557     External RPR Non-Reactive    Rubella Antibody, IgG [[id number]] Resulted:  12/16/16     Specimen:  Blood Updated:  06/10/17 1557     External Rubella Qual Immune    HIV-1 Antibody, EIA [[id number]] Resulted:  12/16/16     Specimen:  Blood Updated:  06/10/17 1557     External HIV-1 Antibody Negative    CBC (No Diff) [[id number]]  (Abnormal) Collected:  06/10/17 1624    Specimen:  Blood Updated:  06/10/17 1636     WBC 10.52 10*3/mm3      RBC 4.69 10*6/mm3      Hemoglobin 12.2 g/dL      Hematocrit 38.1 %      MCV 81.2 (L) fL      MCH 26.0 (L) pg      MCHC 32.0 (L) g/dL      RDW 14.6 %      RDW-SD 42.7 fl      MPV 13.5 (H) fL      Platelets 224 10*3/mm3     Hemoglobin & Hematocrit, Blood [[id number]]  (Abnormal) Collected:  06/11/17 0422    Specimen:  Blood Updated:  06/11/17 0452     Hemoglobin 11.0 (L) g/dL      Hematocrit 35.1  (L) %           Imaging Results (all)     None        Orders (all)     Start     Ordered    06/11/17 0900  docusate sodium (COLACE) capsule 100 mg  2 Times Daily      06/10/17 2355    06/11/17 0800  Sitz Bath  3 Times Daily     Comments:  PRN    06/10/17 2355    06/11/17 0600  Hemoglobin & Hematocrit, Blood  Morning Draw      06/10/17 1648    06/11/17 0600  Hemoglobin & Hematocrit, Blood  Timed,   Status:  Canceled     Comments:  Postpartum Day 1    06/10/17 2355 06/11/17 0030  carboprost (HEMABATE) injection 250 mcg  Once,   Status:  Discontinued      06/10/17 2355    06/11/17 0030  misoprostol (CYTOTEC) tablet 600 mcg  Once,   Status:  Discontinued      06/10/17 2355    06/11/17 0030  methylergonovine (METHERGINE) injection 200 mcg  Once,   Status:  Discontinued      06/10/17 2355    06/11/17 0000  bisacodyl (DULCOLAX) suppository 10 mg  Daily PRN      06/10/17 2355    06/10/17 2356  Full Code  Continuous      06/10/17 2355    06/10/17 2356  Vital Signs Per hospital policy  Per Hospital Policy      06/10/17 2355    06/10/17 2356  Up Ad Judy  Until Discontinued      06/10/17 2355    06/10/17 2356  Ambulate Patient  Every Shift      06/10/17 2355    06/10/17 2356  Fundal and Lochia Check  Per Hospital Policy     Comments:  Q 15 min x 4, Q 30 min x 2, then Q Shift    06/10/17 2355    06/10/17 2356  Notify Physician  Until Discontinued,   Status:  Canceled      06/10/17 2355    06/10/17 2356  Advance Diet as Tolerated  Until Discontinued      06/10/17 2355    06/10/17 2356  VTE Risk Assessment - Low Risk  Once      06/10/17 2355    06/10/17 2356  Pharmacologic VTE Prophylaxis Not Indicated: Low Risk for VTE  Once      06/10/17 2355    06/10/17 2356  Mechanical VTE Prophylaxis Not Indicated: Low Risk for VTE  Once      06/10/17 2355    06/10/17 2355  acetaminophen (TYLENOL) tablet 650 mg  Every 4 Hours PRN      06/10/17 2355    06/10/17 2355  ibuprofen (ADVIL,MOTRIN) tablet 800 mg  Every 6 Hours PRN      06/10/17  2355    06/10/17 2355  oxyCODONE-acetaminophen (PERCOCET) 5-325 MG per tablet 1 tablet  Every 4 Hours PRN      06/10/17 2355    06/10/17 2355  magnesium hydroxide (MILK OF MAGNESIA) suspension 2400 mg/10mL 10 mL  Daily PRN      06/10/17 2355    06/10/17 2355  ondansetron (ZOFRAN) tablet 4 mg  Every 8 Hours PRN      06/10/17 2355    06/10/17 2355  benzocaine-lanolin-aloe vera (DERMOPLAST) 20-0.5 % topical spray  As Needed      06/10/17 2355    06/10/17 2355  hydrocortisone-pramoxine (PROCTOFOAM-HS) 1-1 % rectal foam 1 applicator  As Needed      06/10/17 2355    06/10/17 2355  hydrocortisone (ANUSOL-HC) 2.5 % rectal cream 1 application  As Needed      06/10/17 2355    06/10/17 2355  Tdap (BOOSTRIX) injection 0.5 mL  During Hospitalization      06/10/17 2355    06/10/17 2215  Oxytocin-Lactated Ringers (PITOCIN) 20 UNIT/L in lactated Ringer's 1000 mL IVPB  As Needed,   Status:  Discontinued      06/10/17 2141    06/10/17 2142  Vital Signs Per Hospital Policy  Per Hospital Policy,   Status:  Canceled      06/10/17 2141    06/10/17 2142  Fundal and Lochia Check  Per Hospital Policy,   Status:  Canceled     Comments:  Q 15 min x 4, Q 30 min x 2, then Q Shift    06/10/17 2141    06/10/17 2142  Notify Physician (specified)  Until Discontinued,   Status:  Canceled      06/10/17 2141    06/10/17 2142  Diet Regular  Diet Effective Now      06/10/17 2141    06/10/17 2142  Vital Signs Per Hospital Policy  Per Hospital Policy,   Status:  Canceled      06/10/17 2141    06/10/17 2142  Fundal and Lochia Check  Per Hospital Policy,   Status:  Canceled     Comments:  Q 15 min x 4, Q 30 min x 2, then Q Shift    06/10/17 2141    06/10/17 2142  Notify Physician (specified)  Until Discontinued      06/10/17 2141    06/10/17 2142  Diet Regular  Diet Effective Now,   Status:  Canceled      06/10/17 2141    06/10/17 2141  Oxytocin-Lactated Ringers (PITOCIN) 20 UNIT/L in lactated Ringer's 1000 mL IVPB  Once,   Status:  Discontinued       06/10/17 2141    06/10/17 2141  methylergonovine (METHERGINE) injection 200 mcg  Once As Needed,   Status:  Discontinued      06/10/17 2141    06/10/17 2141  carboprost (HEMABATE) injection 250 mcg  As Needed,   Status:  Discontinued      06/10/17 2141    06/10/17 2141  misoprostol (CYTOTEC) tablet 800 mcg  As Needed,   Status:  Discontinued      06/10/17 2141    06/10/17 2141  ibuprofen (ADVIL,MOTRIN) tablet 600 mg  Every 6 Hours PRN,   Status:  Discontinued      06/10/17 2141    06/10/17 2141  HYDROcodone-acetaminophen (NORCO) 5-325 MG per tablet 1 tablet  Every 4 Hours PRN,   Status:  Discontinued      06/10/17 2141    06/10/17 2130  oxytocin (PITOCIN) injection 10 Units  Once      06/10/17 2149    06/10/17 2046  penicillin G in iso-osmotic dextrose IVPB 3 million units (premix)  Every 4 Hours,   Status:  Discontinued      06/10/17 1647    06/10/17 2015  Oxytocin-Sodium Chloride (PITOCIN) 30-0.9 UT/500ML-% infusion solution  Titrated,   Status:  Discontinued      06/10/17 1931    06/10/17 2001  butorphanol (STADOL) injection 1 mg  Every 1 Hour PRN,   Status:  Discontinued      06/10/17 2002    06/10/17 1922  Repeat vaginal exam and begin pitocin  Once     Comments:  Repeat vaginal exam and begin pitocin    06/10/17 1934    06/10/17 1810  lidocaine (XYLOCAINE) 2% injection  - ADS Override Pull  Status:  Discontinued     Comments:  Created by cabinet override    06/10/17 1810    06/10/17 1810  Oxytocin-Lactated Ringers (PITOCIN) 20 UNIT/L in lactated Ringer's 1000 mL IVPB  - ADS Override Pull  Status:  Discontinued     Comments:  Created by cabinet override    06/10/17 1810    06/10/17 1745  butorphanol (STADOL) injection 2 mg  Every 3 Hours PRN,   Status:  Discontinued      06/10/17 1745    06/10/17 1730  lactated ringers bolus 1,000 mL  Once      06/10/17 1647    06/10/17 1730  penicillin g 5 mu/100 mL 0.9% NS IVPB (mbp)  Once      06/10/17 1647    06/10/17 1730  oxytocin (PITOCIN) 20 Units in lactated ringers  1,000 mL (0.02 Units/mL) infusion  Titrated,   Status:  Discontinued      06/10/17 1649    06/10/17 1730  Oxytocin-Sodium Chloride (PITOCIN) 30-0.9 UT/500ML-% infusion solution  Titrated,   Status:  Discontinued      06/10/17 1656    06/10/17 1730  Oxytocin-Sodium Chloride (PITOCIN) 30-0.9 UT/500ML-% infusion solution  Titrated,   Status:  Discontinued      06/10/17 1658    06/10/17 1659  Pharmacologic VTE Prophylaxis Not Indicated: Low Risk for VTE  Once      06/10/17 1658    06/10/17 165  Mechanical VTE Prophylaxis Not Indicated: Low Risk for VTE  Once      06/10/17 1658    06/10/17 165  VTE Risk Assessment - Low Risk  Once      06/10/17 1658    06/10/17 164  Admit To Obstetrics  Once      06/10/17 1647    06/10/17 164  Vital Signs Per Hospital Policy  Per Hospital Policy,   Status:  Canceled      06/10/17 1647    06/10/17 164  Initiate Group Beta Strep (GBS) Prophylaxis Protocol, If Criteria Met  Continuous,   Status:  Canceled     Comments:  NO TREATMENT RECOMMENDED IF: 1)  Maternal GBS status known negative 2)  Scheduled  birth with intact membranes, not in labor.  3 ) Maternal GBS unknown, no risk factors.   TREAT WITH ANTIBIOTICS IF:  1)  Maternal GBS status is known postive.  2)  Maternal GBS status unknown with these risk factors:  a)  Previous infant affected by GBS infection.  b)  GBS urinary tract infection (UTI) or bacteruria during pregnancy  c)  Unexplained maternal fever in labor (greater than or equal to 100.4F or 38.0C)  d)  Prolonged rupture of the membranes greater than or equal to 18 hours.  e)  Gestational age less than 37 weeks.    06/10/17 1647    06/10/17 1646  Mini- prep prior to delivery  Once,   Status:  Canceled      06/10/17 1647    06/10/17 1646  Monitor fetal heart tones unless patient requests intermittent  Until Discontinued,   Status:  Canceled      06/10/17 1647    06/10/17 164  External uterine contraction monitoring  Per Hospital Policy,   Status:  Canceled       06/10/17 1647    06/10/17 1646  Notify Physician (specified)  Until Discontinued,   Status:  Canceled      06/10/17 1647    06/10/17 1646  Notify physician for tachysystole (per hospital algorithm)  Until Discontinued      06/10/17 1647    06/10/17 1646  Notify physician if membranes ruptured, bleeding greater than 1 pad an hour, fetal heart tone abnormality, and severe pain  Until Discontinued,   Status:  Canceled      06/10/17 1647    06/10/17 1646  NPO Diet Ice chips  Diet Effective Now,   Status:  Canceled      06/10/17 1647    06/10/17 1646  CBC (No Diff)  Once,   Status:  Canceled      06/10/17 1647    06/10/17 1646  Type & Screen  Once,   Status:  Canceled      06/10/17 1647    06/10/17 1646  Insert Peripheral IV  Once,   Status:  Canceled      06/10/17 1647    06/10/17 1646  VTE Risk Assessment - Low Risk  Once      06/10/17 1647    06/10/17 1646  Pharmacologic VTE Prophylaxis Not Indicated: Low Risk for VTE  Once      06/10/17 1647    06/10/17 1646  Mechanical VTE Prophylaxis Not Indicated: Low Risk for VTE  Once      06/10/17 1647    06/10/17 1645  Position Change - For Intra-Uterine Resusitation for Hypertonus, HyperStimulation or Non-Reassuring Fetal Status  As Needed,   Status:  Canceled      06/10/17 1647    06/10/17 1606  Mechanical VTE Prophylaxis Not Indicated: Low Risk for VTE  Once      06/10/17 1607    06/10/17 1602  Admit To Obstetrics  Once      06/10/17 1607    06/10/17 1602  Full Code  Continuous,   Status:  Canceled      06/10/17 1607    06/10/17 1602  Vital Signs Per Hospital Policy  Per Hospital Policy,   Status:  Canceled      06/10/17 1607    06/10/17 1602  Mini- prep prior to delivery  Once,   Status:  Canceled      06/10/17 1607    06/10/17 1602  Monitor fetal heart tones unless patient requests intermittent  Until Discontinued,   Status:  Canceled      06/10/17 1607    06/10/17 1602  External uterine contraction monitoring  Per Hospital Policy,   Status:  Canceled      06/10/17 1607     06/10/17 1602  Notify Physician (specified)  Until Discontinued,   Status:  Canceled      06/10/17 1607    06/10/17 1602  Notify physician for tachysystole (per hospital algorithm)  Until Discontinued,   Status:  Canceled      06/10/17 1607    06/10/17 1602  Notify physician if membranes ruptured, bleeding greater than 1 pad an hour, fetal heart tone abnormality, and severe pain  Until Discontinued,   Status:  Canceled      06/10/17 1607    06/10/17 1602  NPO Diet Ice chips  Diet Effective Now,   Status:  Canceled      06/10/17 1607    06/10/17 1602  CBC (No Diff)  Once      06/10/17 1607    06/10/17 1602  Type & Screen  Once      06/10/17 1607    06/10/17 1602  Insert Peripheral IV  Once,   Status:  Canceled      06/10/17 1607    06/10/17 1602  Saline Lock & Maintain IV Access  Continuous,   Status:  Canceled      06/10/17 1607    06/10/17 1602  VTE Risk Assessment - Low Risk  Once      06/10/17 1607    06/10/17 1602  Pharmacologic VTE Prophylaxis Not Indicated: Low Risk for VTE  Once      06/10/17 1607    06/10/17 1601  Position Change - For Intra-Uterine Resusitation for Hypertonus, HyperStimulation or Non-Reassuring Fetal Status  As Needed,   Status:  Canceled      06/10/17 1607    06/10/17 1601  sodium chloride 0.9 % flush 1-10 mL  As Needed,   Status:  Discontinued      06/10/17 1607    06/10/17 1601  lidocaine (XYLOCAINE) 1 % injection 5 mL  As Needed,   Status:  Discontinued      06/10/17 1607    06/10/17 1542  Admit To Obstetrics  Once      06/10/17 1541    06/10/17 1456  PAMG-1, Rapid Assay For ROM  STAT      06/10/17 1455    06/10/17 1420  Initiate Observation Status  Once      06/10/17 1420    06/10/17 1420  Discharge patient  Once      06/10/17 1420    06/10/17 1315  PAMG-1, Rapid Assay For ROM  STAT      06/10/17 1314    06/10/17 0000  Group B Streptococcus Culture     Comments:  This is an external result entered through the Results Console.    06/10/17 1557    06/10/17 0000  Antibody Screen      Comments:  This is an external result entered through the Results Console.    06/10/17 1557    06/10/17 0000  Chlamydia trachomatis, Neisseria gonorrhoeae, PCR w/ confirmation     Comments:  This is an external result entered through the Results Console.    06/10/17 1557    06/10/17 0000  Gonorrhea Screen     Comments:  This is an external result entered through the Results Console.    06/10/17 1557    06/10/17 0000  Urine Drug Screen     Comments:  This is an external result entered through the Results Console.    06/10/17 1557    06/10/17 0000  Hepatitis B Surface Antigen     Comments:  This is an external result entered through the Results Console.    06/10/17 1557    06/10/17 0000  RPR     Comments:  This is an external result entered through the Results Console.    06/10/17 1557    06/10/17 0000  Rubella Antibody, IgG     Comments:  This is an external result entered through the Results Console.    06/10/17 1557    06/10/17 0000  HIV-1 Antibody, EIA     Comments:  This is an external result entered through the Results Console.    06/10/17 1557    06/10/17 0000  ABO / Rh     Comments:  This is an external result entered through the Results Console.    06/10/17 1558    Unscheduled  Apply Ice to Perineum  As Needed     Comments:  For 20 min q 2 hrs    06/10/17 2355    Unscheduled  Waffle Cushion  As Needed     Comments:  For perineal discomfort    06/10/17 2355    Unscheduled  Donut Ring  As Needed     Comments:  For perineal pain    06/10/17 2355    Unscheduled  Kpad  As Needed     Comments:  For pain    06/10/17 2355    Unscheduled  Apply witch hazel pads / TUCKS to perineum as needed for comfort PRN  As Needed      06/10/17 2355    Unscheduled  Apply ace wrap, tight bra, or binder  As Needed     Comments:  Patient to be wearing a bra at all times, but no binders or ace wraps    06/10/17 2355    Unscheduled  Apply ice packs  As Needed      06/10/17 2355             Operative/Procedure Notes (all)      Roberta  Tereso Lowe DO at 6/10/2017  9:37 PM  Version 1 of 1         [facility]  Vaginal Delivery Note    Delivery     Delivery: Vaginal, Spontaneous Delivery     YOB: 2015    Time of Birth: 9:28 PM      Anesthesia: None     Delivering clinician: Roberta Lowe    Forceps?   No   Vacuum? No    Shoulder dystocia present: No        Delivery narrative:    Progressed to complete and pushing.  Delivered viable male over intact perineum.  Placenta spontaneously expressed grossly  Intact, 3 vessel cord.  apgars 7,9  7 lbs   cc  stadol    Infant    Findings: male  infant     Infant observations: Weight: 7   Length:    in  Observations/Comments:         Apgars:   7 @ 1 minute /       9@ 5 minutes         Placenta, Cord, and Fluid    Placenta delivered  Spontaneous  at         Cord:    present.   Nuchal Cord?  no   Cord blood obtained: No    Cord gases obtained:  No    Cord gas results: Venous:  No results found for: PHCVEN    Arterial:  No results found for: PHCART     Repair    Episiotomy: Not recorded    Lacerations: No   Estimated Blood Loss:   150         Complications  none    Disposition  Mother to Remain in LD  in stable condition currently.  Baby to remains with mom  in stable condition currently.      Roberta Lowe DO  06/10/17  9:39 PM         Electronically signed by Roberta Lowe DO at 6/10/2017  9:41 PM           Physician Progress Notes (all)      Roberta Lowe DO at 6/10/2017  5:45 PM  Version 1 of 1         [facility]  Obstetric Progress Note    Subjective     Patient:    The patient feels uncomfortable with contractions  Declines epidural but would like some IV pain  Medication.    Objective     Vital Signs Range for the last 24 hours  Temp:  [96.7 °F (35.9 °C)] 96.7 °F (35.9 °C)   Temp src: Temporal Artery    BP: (119-122)/(67-76) 122/76   Heart Rate:  [101-116] 101   Resp:  [20] 20               Weight:  [87.1 kg (192 lb)] 87.1 kg (192 lb)       Flowsheet  "Rows         First Filed Value    Admission Height  61\" (154.9 cm) Documented at 06/10/2017 1330    Admission Weight  87.1 kg (192 lb) Documented at 06/10/2017 1330          Presentation: Vertex   Cervix: Exam by:  Chrissy   Dilation:  3-4   Effacement:  100   Station:  -2         Amniotomy with clear fluid    Fetal Heart Rate Assessment   Method: Fetal HR Assessment Method: external   Beats/min: Fetal HR (Beats/Min): 135   Baseline: Fetal HR Baseline: normal range (110-160 bpm)   Varibility: Fetal HR Variability: moderate (amplitude range 6 to 25 bpm)   Accels: Fetal HR Accelerations: greater than/equal to 15 bpm   Decels: Fetal HR Decelerations: absent   Tracing Category: Fetal HR Tracing Category: Category I     Uterine Assessment   Method: Method: TOCO (external toco transducer)   Frequency (min): Contraction Frequency (min): 2-4   Ctx Count in 10 min:     Duration: Contraction Duration (sec): 20-60   Intensity: Contraction Intensity: mild by palpation   Intensity by IUPC:     Resting Tone: Uterine Resting Tone: soft by palpation   Resting Tone by IUPC:     Chris Units:               Assessment:  1.  Intrauterine pregnancy at 38w4d weeks gestation with prolong rupture   2.  Reassuring fetal monitering    Plan:  1. IV pain  2. Pitocin augmentation if needed given prolonged rupture      Roberta Lowe DO  6/10/2017  5:45 PM       Electronically signed by Roberta Lowe DO at 6/10/2017  5:48 PM      Roberta Lowe DO at 2017  1:56 PM  Version 1 of 1         Obstetric History:  OB History      Para Term  AB TAB SAB Ectopic Multiple Living    3 3 3      0 3         Menstrual History:     Patient's last menstrual period was 2016.        Electronically signed by Roberta Lowe DO at 2017  1:57 PM      Roberta Lowe DO at 2017  1:57 PM  Version 1 of 1          [facility]  Vaginal Delivery Progress Note    Subjective   Postpartum Day 1: Vaginal " "Delivery    The patient feels well.  Her pain is well controlled with nonsteroidal anti-inflammatory drugs.   She is ambulating well.  Patient describes her bleeding as thin lochia.    Breastfeeding: declines.    Objective     Vital Signs Range for the last 24 hours  Temperature: Temp:  [97 °F (36.1 °C)-98.7 °F (37.1 °C)] 97.8 °F (36.6 °C)   Temp Source: Temp src: Temporal Artery    BP: BP: (100-148)/(44-81) 120/62   Pulse: Heart Rate:  [] 89   Respirations: Resp:  [18-20] 18   SPO2: SpO2:  [97 %-98 %] 98 %   O2 Amount (l/min):     O2 Devices O2 Device: room air   Weight:       Admit Height:  Height: 61\" (154.9 cm)      Physical Exam:  General:  no acute distresss.  Abdomen: abdomen is soft without significant tenderness, masses, organomegaly or guarding. Fundus: appropriate, firm, non tender  Extremities: normal, atraumatic, no cyanosis, and trace edema.       Lab results reviewed:  Yes              Rh Status:    RH type   Date Value Ref Range Status   06/10/2017 Positive  Final     Immunizations:   Immunization History   Administered Date(s) Administered   • Tdap 03/29/2017       Assessment/Plan     Active Problems:    Normal pregnancy    Normal labor and delivery    Normal labor    Full-term premature rupture of membranes      Maida GOODMAN Day is Day 1  post-partum  Vaginal, Spontaneous Delivery    .      Plan:  Continue current care.      Roberta Lowe DO  6/11/2017  1:59 PM     Electronically signed by Roberta Lowe DO at 6/11/2017  2:00 PM        Consult Notes (all)     No notes of this type exist for this encounter.        "

## 2017-06-11 NOTE — PROGRESS NOTES
"[facility]  Vaginal Delivery Progress Note    Subjective   Postpartum Day 1: Vaginal Delivery    The patient feels well.  Her pain is well controlled with nonsteroidal anti-inflammatory drugs.   She is ambulating well.  Patient describes her bleeding as thin lochia.    Breastfeeding: declines.    Objective     Vital Signs Range for the last 24 hours  Temperature: Temp:  [97 °F (36.1 °C)-98.7 °F (37.1 °C)] 97.8 °F (36.6 °C)   Temp Source: Temp src: Temporal Artery    BP: BP: (100-148)/(44-81) 120/62   Pulse: Heart Rate:  [] 89   Respirations: Resp:  [18-20] 18   SPO2: SpO2:  [97 %-98 %] 98 %   O2 Amount (l/min):     O2 Devices O2 Device: room air   Weight:       Admit Height:  Height: 61\" (154.9 cm)      Physical Exam:  General:  no acute distresss.  Abdomen: abdomen is soft without significant tenderness, masses, organomegaly or guarding. Fundus: appropriate, firm, non tender  Extremities: normal, atraumatic, no cyanosis, and trace edema.       Lab results reviewed:  Yes              Rh Status:    RH type   Date Value Ref Range Status   06/10/2017 Positive  Final     Immunizations:   Immunization History   Administered Date(s) Administered   • Tdap 03/29/2017       Assessment/Plan     Active Problems:    Normal pregnancy    Normal labor and delivery    Normal labor    Full-term premature rupture of membranes      Maida GOODMAN Day is Day 1  post-partum  Vaginal, Spontaneous Delivery    .      Plan:  Continue current care.      Roberta Lowe DO  6/11/2017  1:59 PM  "

## 2017-06-11 NOTE — PLAN OF CARE
Problem: Patient Care Overview (Adult)  Goal: Plan of Care Review  Outcome: Ongoing (interventions implemented as appropriate)    06/10/17 2300   Patient Care Overview   Progress improving   Coping/Psychosocial Response Interventions   Plan Of Care Reviewed With patient       Goal: Adult Individualization and Mutuality  Outcome: Ongoing (interventions implemented as appropriate)  Goal: Discharge Needs Assessment  Outcome: Ongoing (interventions implemented as appropriate)    Problem: Labor (Cervical Ripen, Induct, Augment) (Adult,Obstetrics,Pediatric)  Goal: Signs and Symptoms of Listed Potential Problems Will be Absent or Manageable (Labor)  Outcome: Outcome(s) achieved Date Met:  06/10/17

## 2017-06-11 NOTE — L&D DELIVERY NOTE
[facility]  Vaginal Delivery Note    Delivery     Delivery: Vaginal, Spontaneous Delivery     YOB: 2015    Time of Birth: 9:28 PM      Anesthesia: None     Delivering clinician: Roberta Lowe    Forceps?   No   Vacuum? No    Shoulder dystocia present: No        Delivery narrative:    Progressed to complete and pushing.  Delivered viable male over intact perineum.  Placenta spontaneously expressed grossly  Intact, 3 vessel cord.  apgars 7,9  7 lbs   cc  stadol    Infant    Findings: male  infant     Infant observations: Weight: 7   Length:    in  Observations/Comments:         Apgars:   7 @ 1 minute /       9@ 5 minutes         Placenta, Cord, and Fluid    Placenta delivered  Spontaneous  at         Cord:    present.   Nuchal Cord?  no   Cord blood obtained: No    Cord gases obtained:  No    Cord gas results: Venous:  No results found for: PHCVEN    Arterial:  No results found for: PHCART     Repair    Episiotomy: Not recorded    Lacerations: No   Estimated Blood Loss:   150         Complications  none    Disposition  Mother to Remain in LD  in stable condition currently.  Baby to remains with mom  in stable condition currently.      Roberta Lowe DO  06/10/17  9:39 PM

## 2017-06-11 NOTE — PLAN OF CARE
Problem: Patient Care Overview (Adult)  Goal: Plan of Care Review  Outcome: Ongoing (interventions implemented as appropriate)    06/11/17 9456   Patient Care Overview   Progress improving   Coping/Psychosocial Response Interventions   Plan Of Care Reviewed With patient;significant other   Outcome Evaluation   Outcome Summary/Follow up Plan vitals stable, fundus firm, midline, light bleeding, ambulating well, voiding PBP, pain controlled with PO meds       Goal: Adult Individualization and Mutuality  Outcome: Ongoing (interventions implemented as appropriate)  Goal: Discharge Needs Assessment  Outcome: Ongoing (interventions implemented as appropriate)    Problem: Postpartum, Vaginal Delivery (Adult)  Goal: Signs and Symptoms of Listed Potential Problems Will be Absent or Manageable (Postpartum, Vaginal Delivery)  Outcome: Ongoing (interventions implemented as appropriate)

## 2017-06-11 NOTE — PROGRESS NOTES
Obstetric History:  OB History      Para Term  AB TAB SAB Ectopic Multiple Living    3 3 3      0 3         Menstrual History:     Patient's last menstrual period was 2016.

## 2017-06-11 NOTE — PLAN OF CARE
Problem: Patient Care Overview (Adult)  Goal: Adult Individualization and Mutuality  Outcome: Ongoing (interventions implemented as appropriate)    06/11/17 1712   Individualization   Patient Specific Preferences formula feeding    Patient Specific Goals pain control    Patient Specific Interventions give pain med as needed   Mutuality/Individual Preferences   What Anxieties, Fears or Concerns Do You Have About Your Health or Care? none   What Questions Do You Have About Your Health or Care? none   What Information Would Help Us Give You More Personalized Care? none       Goal: Discharge Needs Assessment  Outcome: Ongoing (interventions implemented as appropriate)    Problem: Postpartum, Vaginal Delivery (Adult)  Goal: Signs and Symptoms of Listed Potential Problems Will be Absent or Manageable (Postpartum, Vaginal Delivery)  Outcome: Ongoing (interventions implemented as appropriate)

## 2017-06-12 VITALS
WEIGHT: 192 LBS | BODY MASS INDEX: 36.25 KG/M2 | DIASTOLIC BLOOD PRESSURE: 59 MMHG | RESPIRATION RATE: 16 BRPM | TEMPERATURE: 97.6 F | HEIGHT: 61 IN | SYSTOLIC BLOOD PRESSURE: 114 MMHG | OXYGEN SATURATION: 99 % | HEART RATE: 74 BPM

## 2017-06-12 RX ORDER — OXYCODONE HYDROCHLORIDE AND ACETAMINOPHEN 5; 325 MG/1; MG/1
1 TABLET ORAL EVERY 4 HOURS PRN
Qty: 30 TABLET | Refills: 0 | Status: SHIPPED | OUTPATIENT
Start: 2017-06-12 | End: 2017-06-20

## 2017-06-12 RX ORDER — IBUPROFEN 800 MG/1
800 TABLET ORAL EVERY 6 HOURS PRN
Qty: 30 TABLET | Refills: 0 | Status: SHIPPED | OUTPATIENT
Start: 2017-06-12 | End: 2017-06-20

## 2017-06-12 RX ADMIN — DOCUSATE SODIUM 100 MG: 100 CAPSULE ORAL at 08:27

## 2017-06-12 RX ADMIN — IBUPROFEN 800 MG: 200 TABLET, FILM COATED ORAL at 13:58

## 2017-06-12 NOTE — LACTATION NOTE
This note was copied from a baby's chart.  Discussion with the mother of her infant feeding plan. Mother  has chosen not to breastfeed her infant, infant will receive all formula feedings during hospital stay per mothers request. Education completed on Benefits of Breastfeeding, Milk Suppression and Safe Formula Preparation. Instructed mother to call Lactation if she has any questions/concerns or decides she would like to breastfeed infant or has problems with breast milk suppression,  mother verbalized good understanding.    Suppression of Lactation for Non-Nursing Mothers handout    If you choose not to breastfeed, please let us know if you have any questions about whether yours was the right choice for you and your family.  While there are a very few conditions where breastfeeding is not recommended, most uses surrounding breastfeeding can be managed with proper support.  We are here to hep and support you no matter how you choose to feed your baby.    To suppress further lactation and prevent milk from coming in-- as much as possible:  **Wear a good fitting support bra without an under wire (sports bras are good)   **Wear bra continuously day and night for about 1-2 weeks  **Avoid any kind of breast stimulation such as rubbing, warmth or massage.  ** While showering, try to stand with your back to the water. The warm water will     encourage milk flow.  **Cold compression may be applied for 20 minutes once per hour as needed.  **Anti-Inflammatory medications such as ibuprofen (Motrin) may help.  Ue per your doctors and/or manufacture instructions.  ** If you develop a fever greater than 101 F, especially if you also have flu- like symptoms and any areas of redness or swelling in your breasts, please call your physician. You may need treatment for a condition called mastitis.      The Many Benefits if Breastfeeding   Breastfeeding saves time  *Breastfeeding allows you to calm or feed your baby immediately, which  leads to a happier baby who cries less  *There is nothing to buy, prepare, or maintain.There is nothing to clean or sterilize.  Breastfeeding builds a mothers confidence  *She knows all her baby needs to thrive is her!  Breastfeeding saves Money  *There is no formula to buy and healthier breast fed babies have less medical costs  Healthy Mom/Healthy baby  * babies get sick less often, and when they do they are usually sick less severely and for a shorter time  * babies have fewer ear infections  * babies have fewer allergies  *Mothers who breastfeed have a lower risk for cancer, osteoporosis, anemia, high blood pressure, obesity, and Type ll diabetes  *Mothers miss less work days with sick babies  Breast fed babies have a better dental health  * babies have better jaw development which requires lest orthodontic work  *Breast milk does not promote cavities  * babies can nurse at night without worry of tooth decay  Breastfeeding allows a baby to reach his full IQ potential  *The longer a baby is breast fed, the better their brain development  Breast fed babies and moms are more relaxed  *The hormones released during breastfeeding have a calming effect on mothers  *Breastfeeding requires mom to take a break; this may help mom get more rest after delivery  *Breastfeeding is quicker than preparing formula which allows mom and baby to get back to sleep faster  *Breastfeeding promotes bonding and allows mom to learn babies cues and care needs more quickly  Breastfeeding cleanup is easier  *The bowel movements and spit up of breast fed babies doesn't smell as bad  *Spit-up of breast fed babies doesn't stain clothing  Getting out of the hourse is easier  *No formula bottles to prepare and carry safely   *No time restraints due to worry about what baby will eat  *No worries about warming a bottle or finding safe water to prepare bottles  Breastfeeding mother get their bodies back  sooner  *The uterus shrinks more quickly and completely, which allows a flatter tummy  *Breastfeeding burns 400-500 calories a day; making milk torches stored fat!  Breastfeeding is better for the environment  *There is no trash to dispose of after breastfeeding  *There is no production facility to produce breast milk; moms body does it all without the pollution of a factory          Safe use and Preparation of Infant Formula Hand out adapted from: www.foodsafety.gov  Formula Feeding handout by Lactation Education Resources 2    Instructed on [facility] Lactation Office Contact information for support after discharge with suppression.

## 2017-06-12 NOTE — NURSING NOTE
Pt discharged to home in stable condition.  Discharge instructions discussed with pt, she verbalized understanding and denies further questions at this time.  Pt is aware of follow up appointment with Dr. Cruz.

## 2017-06-12 NOTE — PLAN OF CARE
Problem: Patient Care Overview (Adult)  Goal: Plan of Care Review  Outcome: Ongoing (interventions implemented as appropriate)    06/12/17 0601   Patient Care Overview   Progress improving   Coping/Psychosocial Response Interventions   Plan Of Care Reviewed With patient   Outcome Evaluation   Outcome Summary/Follow up Plan VSS, FF ML UU, scant to light lochia, ambulating in room, voiding, pain controlled with po meds       Goal: Adult Individualization and Mutuality  Outcome: Ongoing (interventions implemented as appropriate)  Goal: Discharge Needs Assessment  Outcome: Ongoing (interventions implemented as appropriate)    Problem: Postpartum, Vaginal Delivery (Adult)  Goal: Signs and Symptoms of Listed Potential Problems Will be Absent or Manageable (Postpartum, Vaginal Delivery)  Outcome: Ongoing (interventions implemented as appropriate)

## 2017-06-12 NOTE — DISCHARGE SUMMARY
[facility]  Delivery Discharge Summary    Primary OB Clinician:     EDC: Estimated Date of Delivery: 6/20/17    Gestational Age:38w4d    Antepartum complications: none    Date of Delivery: 6/10/2017   Time of Delivery: 9:28 PM     Delivered By:  Roberta Lowe     Delivery Type: Vaginal, Spontaneous Delivery        Anesthesia: None      Intrapartum complications: None    Laceration: No    Episiotomy: No    Placenta: Spontaneous     Feeding method: Breastfeeding Status: Unknown    Rh Immune globulin given: not applicable    Rubella vaccine given: not applicable    Discharge Date: 6/12/2017; Discharge Time: 12:50 PM    Early Discharge:  NO    Plan:    Address and phone number verified and same.  Follow-up appointment with Nancy in 6 weeks

## 2017-06-13 NOTE — PAYOR COMM NOTE
"PR home 6-12-17    [id number]  Angélica Wilderly REX (22 y.o. Female)     Date of Birth Social Security Number Address Home Phone MRN    [id number]  [id number]  [place] [address] [phone] [id number]    Rastafari Marital Status          None Single       Admission Date Admission Type Admitting Provider Attending Provider Department, Room/Bed    6/10/17 Elective Roberta Lowe DO  [facility] MOTHER BABY 2A, M223/1    Discharge Date Discharge Disposition Discharge Destination        6/12/2017 Home or Self Care             Attending Provider: (none)    Allergies:  No Known Allergies    Isolation:  None   Infection:  None   Code Status:  Prior    Ht:  61\" (154.9 cm)   Wt:  192 lb (87.1 kg)    Admission Cmt:  None   Principal Problem:  None                Active Insurance as of 6/10/2017     Primary Coverage     Payor Plan Insurance Group Employer/Plan Group    [employer] [place] [employer] [place]ICA[place]      Payor Plan Address Payor Plan Phone Number Effective From Effective To    [address] [address] [phone] 12/16/2016     [place] [address]       Subscriber Name Subscriber Birth Date Member ID       NORY WILDER 1995 [id number]                 Emergency Contacts      (Rel.) Home Phone Work Phone Mobile Phone    Tabor,Nory (Mother) [phone] -- --               Discharge Summary      Robreta Lowe DO at 6/12/2017 12:49 PM          [facility]  Delivery Discharge Summary    Primary OB Clinician:     EDC: Estimated Date of Delivery: 6/20/17    Gestational Age:38w4d    Antepartum complications: none    Date of Delivery: 6/10/2017   Time of Delivery: 9:28 PM     Delivered By:  Roberta Lowe     Delivery Type: Vaginal, Spontaneous Delivery        Anesthesia: None      Intrapartum complications: None    Laceration: No    Episiotomy: No    Placenta: Spontaneous     Feeding method: Breastfeeding Status: Unknown    Rh Immune globulin given: not applicable    Rubella " vaccine given: not applicable    Discharge Date: 6/12/2017; Discharge Time: 12:50 PM    Early Discharge:  NO    Plan:    Address and phone number verified and same.  Follow-up appointment with Nancy in 6 weeks      Electronically signed by Roberta Lowe DO at 6/12/2017 12:50 PM

## 2020-12-09 ENCOUNTER — INITIAL PRENATAL (OUTPATIENT)
Dept: OBSTETRICS AND GYNECOLOGY | Facility: CLINIC | Age: 25
End: 2020-12-09

## 2020-12-09 VITALS — SYSTOLIC BLOOD PRESSURE: 102 MMHG | BODY MASS INDEX: 35.33 KG/M2 | DIASTOLIC BLOOD PRESSURE: 64 MMHG | WEIGHT: 187 LBS

## 2020-12-09 DIAGNOSIS — Z34.91 PRENATAL CARE IN FIRST TRIMESTER: Primary | ICD-10-CM

## 2020-12-09 DIAGNOSIS — Z78.9 NON-SMOKER: ICD-10-CM

## 2020-12-09 LAB
GEN CATEG CVX/VAG CYTO-IMP: NORMAL
LAB AP CASE REPORT: NORMAL
LAB AP GYN ADDITIONAL INFORMATION: NORMAL
LAB AP GYN OTHER FINDINGS: NORMAL
PATH INTERP SPEC-IMP: NORMAL
STAT OF ADQ CVX/VAG CYTO-IMP: NORMAL

## 2020-12-09 PROCEDURE — 99213 OFFICE O/P EST LOW 20 MIN: CPT | Performed by: OBSTETRICS & GYNECOLOGY

## 2020-12-09 PROCEDURE — G0123 SCREEN CERV/VAG THIN LAYER: HCPCS | Performed by: OBSTETRICS & GYNECOLOGY

## 2020-12-09 NOTE — PROGRESS NOTES
Reason for visit: Routine OB visit at 8w1d     CC:  Here for NEW OBV without complaints of vaginal bleeding, pain or nausea/emesis.    ROS: All systems reviewed and are negative with exception of the following    Vitals, urine, FHT, and exam noted also in the prenatal flow sheet    Exam  HEENT: NCAT, EMOI  Abdomen is soft and nontender.  Uterus is consistent with EGA  Ext are NT    Impression  Diagnoses and all orders for this visit:    1. Prenatal care in first trimester (Primary)    2. Non-smoker    Other orders  -     Liquid-based Pap Smear, Screening  -     ABO / Rh  -     Ambulatory Referral to Perinatology  -     Antibody Screen  -     CBC & Differential  -     Chlamydia trachomatis, Neisseria gonorrhoeae, PCR w/ confirmation - Urine, Urine, Clean Catch  -     ToxASSURE Select 13 (MW) - Urine, Clean Catch  -     Urine Culture - , Urine, Clean Catch  -     Rubella Antibody, IgG  -     RPR  -     HIV-1 / O / 2 Ag / Antibody 4th Generation  -     Hepatitis B Surface Antigen         Vaginal bleeding warnings were given.  Pelvic pain warnings were given.  Nausea and vomiting warnings reviewed.  Patient was instructed to call the office for any concerns or problems.    Ultrasound for next appt not indicated    Declines flu shot; will let us know if she changes her mind  Glo discussed  Desires BTL; will get papers signed at around 30 weeks    Yogesh Jean MD

## 2020-12-12 PROBLEM — Z28.39 RUBELLA NON-IMMUNE STATUS, ANTEPARTUM: Status: ACTIVE | Noted: 2020-12-12

## 2020-12-12 PROBLEM — O09.899 RUBELLA NON-IMMUNE STATUS, ANTEPARTUM: Status: ACTIVE | Noted: 2020-12-12

## 2020-12-12 LAB
ABO GROUP BLD: NORMAL
BASOPHILS # BLD AUTO: 0.03 10*3/MM3 (ref 0–0.2)
BASOPHILS NFR BLD AUTO: 0.4 % (ref 0–1.5)
BLD GP AB SCN SERPL QL: NEGATIVE
C TRACH RRNA SPEC QL NAA+PROBE: NEGATIVE
DRUGS UR: NORMAL
EOSINOPHIL # BLD AUTO: 0.09 10*3/MM3 (ref 0–0.4)
EOSINOPHIL NFR BLD AUTO: 1.2 % (ref 0.3–6.2)
ERYTHROCYTE [DISTWIDTH] IN BLOOD BY AUTOMATED COUNT: 13.7 % (ref 12.3–15.4)
HBV SURFACE AG SERPL QL IA: NEGATIVE
HCT VFR BLD AUTO: 38.4 % (ref 34–46.6)
HGB BLD-MCNC: 12.1 G/DL (ref 12–15.9)
HIV 1+2 AB+HIV1 P24 AG SERPL QL IA: NON REACTIVE
IMM GRANULOCYTES # BLD AUTO: 0.02 10*3/MM3 (ref 0–0.05)
IMM GRANULOCYTES NFR BLD AUTO: 0.3 % (ref 0–0.5)
LYMPHOCYTES # BLD AUTO: 2.29 10*3/MM3 (ref 0.7–3.1)
LYMPHOCYTES NFR BLD AUTO: 30.7 % (ref 19.6–45.3)
MCH RBC QN AUTO: 28.5 PG (ref 26.6–33)
MCHC RBC AUTO-ENTMCNC: 31.5 G/DL (ref 31.5–35.7)
MCV RBC AUTO: 90.6 FL (ref 79–97)
MONOCYTES # BLD AUTO: 0.49 10*3/MM3 (ref 0.1–0.9)
MONOCYTES NFR BLD AUTO: 6.6 % (ref 5–12)
N GONORRHOEA RRNA SPEC QL NAA+PROBE: NEGATIVE
NEUTROPHILS # BLD AUTO: 4.55 10*3/MM3 (ref 1.7–7)
NEUTROPHILS NFR BLD AUTO: 60.8 % (ref 42.7–76)
NRBC BLD AUTO-RTO: 0 /100 WBC (ref 0–0.2)
PLATELET # BLD AUTO: 266 10*3/MM3 (ref 140–450)
RBC # BLD AUTO: 4.24 10*6/MM3 (ref 3.77–5.28)
RH BLD: POSITIVE
RPR SER QL: NON REACTIVE
RUBV IGG SERPL IA-ACNC: 0.94 INDEX
WBC # BLD AUTO: 7.47 10*3/MM3 (ref 3.4–10.8)

## 2021-01-06 ENCOUNTER — ROUTINE PRENATAL (OUTPATIENT)
Dept: OBSTETRICS AND GYNECOLOGY | Facility: CLINIC | Age: 26
End: 2021-01-06

## 2021-01-06 VITALS — DIASTOLIC BLOOD PRESSURE: 74 MMHG | SYSTOLIC BLOOD PRESSURE: 104 MMHG | BODY MASS INDEX: 34.2 KG/M2 | WEIGHT: 181 LBS

## 2021-01-06 DIAGNOSIS — Z78.9 NON-SMOKER: ICD-10-CM

## 2021-01-06 DIAGNOSIS — Z34.91 PRENATAL CARE IN FIRST TRIMESTER: Primary | ICD-10-CM

## 2021-01-06 PROBLEM — Z37.9 NORMAL LABOR: Status: RESOLVED | Noted: 2017-06-10 | Resolved: 2021-01-06

## 2021-01-06 PROBLEM — Z34.90 NORMAL PREGNANCY: Status: RESOLVED | Noted: 2017-06-10 | Resolved: 2021-01-06

## 2021-01-06 PROBLEM — O42.92 FULL-TERM PREMATURE RUPTURE OF MEMBRANES: Status: RESOLVED | Noted: 2017-06-10 | Resolved: 2021-01-06

## 2021-01-06 LAB
GLUCOSE UR STRIP-MCNC: NEGATIVE MG/DL
PROT UR STRIP-MCNC: NEGATIVE MG/DL

## 2021-01-06 PROCEDURE — 99212 OFFICE O/P EST SF 10 MIN: CPT | Performed by: OBSTETRICS & GYNECOLOGY

## 2021-01-06 NOTE — PROGRESS NOTES
Reason for visit: Routine OB visit at 12w1d     CC:  Here for routine OBV without complaints of vaginal bleeding, pain or nausea/emesis.    ROS: All systems reviewed and are negative with exception of the following    Vitals, urine, FHT, and exam noted also in the prenatal flow sheet    Exam  HEENT: NCAT, EMOI  Abdomen is soft and nontender.  Uterus is consistent with EGA  Ext are NT    Impression  Diagnoses and all orders for this visit:    1. Prenatal care in first trimester (Primary)    2. Non-smoker         Vaginal bleeding warnings were given.  Pelvic pain warnings were given.  Nausea and vomiting warnings reviewed.  Patient was instructed to call the office for any concerns or problems.    Ultrasound for next appt dyana Jean MD

## 2021-02-03 ENCOUNTER — ROUTINE PRENATAL (OUTPATIENT)
Dept: OBSTETRICS AND GYNECOLOGY | Facility: CLINIC | Age: 26
End: 2021-02-03

## 2021-02-03 VITALS — SYSTOLIC BLOOD PRESSURE: 122 MMHG | BODY MASS INDEX: 34.2 KG/M2 | DIASTOLIC BLOOD PRESSURE: 72 MMHG | WEIGHT: 181 LBS

## 2021-02-03 DIAGNOSIS — Z34.82 ENCOUNTER FOR SUPERVISION OF OTHER NORMAL PREGNANCY IN SECOND TRIMESTER: Primary | ICD-10-CM

## 2021-02-03 DIAGNOSIS — Z78.9 NON-SMOKER: ICD-10-CM

## 2021-02-03 LAB
GLUCOSE UR STRIP-MCNC: NEGATIVE MG/DL
PROT UR STRIP-MCNC: NEGATIVE MG/DL

## 2021-02-03 PROCEDURE — 99212 OFFICE O/P EST SF 10 MIN: CPT | Performed by: OBSTETRICS & GYNECOLOGY

## 2021-02-03 NOTE — PROGRESS NOTES
Reason for visit: Routine OB visit at 16w1d     CC:  Here for routine OBV without complaints of vaginal bleeding, pain or nausea/emesis.    ROS: All systems reviewed and are negative with exception of the following    Vitals, urine, FHT, and exam noted also in the prenatal flow sheet    Exam  HEENT: NCAT, EMOI  Abdomen is soft and nontender.  Uterus is consistent with EGA  Ext are NT    Impression  Diagnoses and all orders for this visit:    1. Encounter for supervision of other normal pregnancy in second trimester (Primary)    2. Non-smoker         Vaginal bleeding warnings were given.  Pelvic pain warnings were given.  Nausea and vomiting warnings reviewed.  Patient was instructed to call the office for any concerns or problems.    Ultrasound for next appt not indicated    Yogesh Jean MD

## 2021-03-03 ENCOUNTER — ROUTINE PRENATAL (OUTPATIENT)
Dept: OBSTETRICS AND GYNECOLOGY | Facility: CLINIC | Age: 26
End: 2021-03-03

## 2021-03-03 VITALS — DIASTOLIC BLOOD PRESSURE: 74 MMHG | SYSTOLIC BLOOD PRESSURE: 112 MMHG | WEIGHT: 181 LBS | BODY MASS INDEX: 34.2 KG/M2

## 2021-03-03 DIAGNOSIS — Z34.92 PRENATAL CARE IN SECOND TRIMESTER: Primary | ICD-10-CM

## 2021-03-03 DIAGNOSIS — Z78.9 NON-SMOKER: ICD-10-CM

## 2021-03-03 LAB
GLUCOSE UR STRIP-MCNC: NEGATIVE MG/DL
PROT UR STRIP-MCNC: NEGATIVE MG/DL

## 2021-03-03 PROCEDURE — 99213 OFFICE O/P EST LOW 20 MIN: CPT | Performed by: OBSTETRICS & GYNECOLOGY

## 2021-03-03 NOTE — PROGRESS NOTES
Reason for visit: Routine OB visit at 20w1d     CC:  The patient is here for routine OBV at 20 weeks and 1 day.  She has had 3 previous vaginal deliveries at term. This is her 4th pregnancy. The patient reports normal fetal movement and has no symptoms of labor ( or otherwise), denies VB.  No signs or symptoms of pregnancy induced hypertension.     She quit smoking when she found out she was pregnant. She has passed all prior gestational diabetes screenings.    ROS: All systems reviewed and are negative with exception of the following    Vitals, urine, FHT, and exam noted also in the prenatal flow sheet    Exam  HEENT: NCAT, EMOI  Abdomen is soft and nontender.  Uterus is consistent with EGA  Ext are NT    I have personally reviewed the US performed on 2021 by Dr. Gilbert Dang    COMMENTS       PEREIRA INTRAUTERINE GESTATION   VERTEX PRESENTATION   PLACENTAL LOCATION: POSTERIOR   THERE IS NO US EVIDENCE OF A PLACENTAL   PREVIA   AMNIOTIC FLUID VOLUME: SUBJECTIVELY NORMAL   CERVIX: APPEARS LONG, CLOSED, NO EVIDENCE   OF FUNNELING   UTERUS AND ADNEXA: NO ANOMALIES VISUALIZED   THIS COMPLETES THE ANATOMIC ASSESSMENT   ON THIS FETUS  ----------------------------------------------------------------------  IMPRESSION       Overall size is consistent with dates.   Anatomic assessment; no structural anomalies   visualized. No US markers for aneuploidy visualized.   Normal fluid.       Findings discussed with Maida.   Secondary to BMI >30, recommend FU US at 32 weeks   for interval growth, particularly if having trouble following   fetal growth clinically secondary to habitus.    Impression  Diagnoses and all orders for this visit:    1. Prenatal care in second trimester (Primary)    2. Non-smoker        Kick count instructions were reviewed and encouraged.  Labor signs and symptoms were reviewed.  Patient was encouraged to come to hospital or call for bleeding, leakage of fluid or any other  concerns.    Preeclampsia warnings were also reviewed with the patient.    The patient will return in 1 month. She was counseled on diabetic testing at 27-28 weeks. She will undergo a growth ultrasound at 30-32 weeks.     KAYY BLACKMON    Scribed for Yogesh Jean MD by KAYY BLACKMON.  03/03/21   11:42 CST    I have personally performed the services described in this document as scribed by the above individual, and it is both accurate and complete.  Yogesh Jean MD  3/3/2021  12:34 CST

## 2021-03-31 ENCOUNTER — ROUTINE PRENATAL (OUTPATIENT)
Dept: OBSTETRICS AND GYNECOLOGY | Facility: CLINIC | Age: 26
End: 2021-03-31

## 2021-03-31 VITALS — BODY MASS INDEX: 35.33 KG/M2 | SYSTOLIC BLOOD PRESSURE: 108 MMHG | WEIGHT: 187 LBS | DIASTOLIC BLOOD PRESSURE: 68 MMHG

## 2021-03-31 DIAGNOSIS — Z78.9 NON-SMOKER: ICD-10-CM

## 2021-03-31 DIAGNOSIS — Z34.92 PRENATAL CARE IN SECOND TRIMESTER: Primary | ICD-10-CM

## 2021-03-31 LAB
GLUCOSE UR STRIP-MCNC: NEGATIVE MG/DL
PROT UR STRIP-MCNC: NEGATIVE MG/DL

## 2021-03-31 PROCEDURE — 99213 OFFICE O/P EST LOW 20 MIN: CPT | Performed by: OBSTETRICS & GYNECOLOGY

## 2021-03-31 NOTE — PROGRESS NOTES
Reason for visit: Routine OB visit at 24w1d     CC:  Here for routine OBV.  Patient reports normal fetal movement and has no symptoms of labor ( or otherwise), denies VB.  No signs or symptoms of pregnancy induced hypertension.    ROS: All systems reviewed and are negative with exception of the following    Vitals, urine, FHT, and exam noted also in the prenatal flow sheet    Exam  HEENT: NCAT, EMOI  Abdomen is soft and nontender.  Uterus is consistent with EGA  Ext are NT    Impression  Diagnoses and all orders for this visit:    1. Prenatal care in second trimester (Primary)    2. Non-smoker              Kick count instructions were reviewed and encouraged.  Labor signs and symptoms were reviewed.  Patient was encouraged to come to hospital or call for bleeding, leakage of fluid or any other concerns.    Preeclampsia warnings were also reviewed with the patient.      Ultrasound for next appt not indicated    G&D:  COMMENTS         PEREIRA INTRAUTERINE GESTATION    VERTEX PRESENTATION    PLACENTAL LOCATION: POSTERIOR    THERE IS NO US EVIDENCE OF A PLACENTAL    PREVIA    AMNIOTIC FLUID VOLUME: SUBJECTIVELY NORMAL    CERVIX: APPEARS LONG, CLOSED, NO EVIDENCE    OF FUNNELING    UTERUS AND ADNEXA: NO ANOMALIES VISUALIZED    THIS COMPLETES THE ANATOMIC ASSESSMENT    ON THIS FETUS   ----------------------------------------------------------------------   IMPRESSION         Overall size is consistent with dates.    Anatomic assessment; no structural anomalies    visualized. No US markers for aneuploidy visualized.    Normal fluid.         Findings discussed with Maida.    Secondary to BMI >30, recommend FU US at 32 weeks    for interval growth, particularly if having trouble following    fetal growth clinically secondary to habitus.       Yogesh Jean MD

## 2021-04-30 ENCOUNTER — ROUTINE PRENATAL (OUTPATIENT)
Dept: OBSTETRICS AND GYNECOLOGY | Facility: CLINIC | Age: 26
End: 2021-04-30

## 2021-04-30 VITALS — BODY MASS INDEX: 36.47 KG/M2 | WEIGHT: 193 LBS | SYSTOLIC BLOOD PRESSURE: 136 MMHG | DIASTOLIC BLOOD PRESSURE: 74 MMHG

## 2021-04-30 DIAGNOSIS — Z34.93 PRENATAL CARE IN THIRD TRIMESTER: Primary | ICD-10-CM

## 2021-04-30 DIAGNOSIS — Z28.39 RUBELLA NON-IMMUNE STATUS, ANTEPARTUM: ICD-10-CM

## 2021-04-30 DIAGNOSIS — Z78.9 NON-SMOKER: ICD-10-CM

## 2021-04-30 DIAGNOSIS — O09.899 RUBELLA NON-IMMUNE STATUS, ANTEPARTUM: ICD-10-CM

## 2021-04-30 LAB
GLUCOSE UR STRIP-MCNC: NEGATIVE MG/DL
PROT UR STRIP-MCNC: NEGATIVE MG/DL

## 2021-04-30 PROCEDURE — 99213 OFFICE O/P EST LOW 20 MIN: CPT | Performed by: OBSTETRICS & GYNECOLOGY

## 2021-04-30 NOTE — PROGRESS NOTES
Reason for visit: Routine OB visit at 28w3d     CC:  Here for routine OBV.  Patient reports normal fetal movement and has no symptoms of labor ( or otherwise), denies VB.  No signs or symptoms of pregnancy induced hypertension.    ROS: All systems reviewed and are negative with exception of the following    Vitals, urine, FHT, and exam noted also in the prenatal flow sheet    Exam  HEENT: NCAT, EMOI  Abdomen is soft and nontender.  Uterus is consistent with EGA  Ext are NT    Impression  Diagnoses and all orders for this visit:    1. Prenatal care in third trimester (Primary)  -     Gestational Screen 1 Hr ([facility])  -     Hemoglobin    2. Rubella non-immune status, antepartum    3. Non-smoker              Kick count instructions were reviewed and encouraged.  Labor signs and symptoms were reviewed.  Patient was encouraged to come to hospital or call for bleeding, leakage of fluid or any other concerns.    Preeclampsia warnings were also reviewed with the patient.      Ultrasound for next appt not indicated    Yogesh Jean MD

## 2021-05-01 LAB
GLUCOSE 1H P 50 G GLC PO SERPL-MCNC: 110 MG/DL (ref 65–139)
HGB BLD-MCNC: 12 G/DL (ref 12–15.9)

## 2021-05-07 ENCOUNTER — TELEPHONE (OUTPATIENT)
Dept: OBSTETRICS AND GYNECOLOGY | Facility: CLINIC | Age: 26
End: 2021-05-07

## 2021-05-12 ENCOUNTER — ROUTINE PRENATAL (OUTPATIENT)
Dept: OBSTETRICS AND GYNECOLOGY | Facility: CLINIC | Age: 26
End: 2021-05-12

## 2021-05-12 VITALS — BODY MASS INDEX: 36.47 KG/M2 | DIASTOLIC BLOOD PRESSURE: 76 MMHG | SYSTOLIC BLOOD PRESSURE: 120 MMHG | WEIGHT: 193 LBS

## 2021-05-12 DIAGNOSIS — Z34.93 PRENATAL CARE IN THIRD TRIMESTER: Primary | ICD-10-CM

## 2021-05-12 DIAGNOSIS — Z78.9 NON-SMOKER: ICD-10-CM

## 2021-05-12 LAB
GLUCOSE UR STRIP-MCNC: NEGATIVE MG/DL
PROT UR STRIP-MCNC: NEGATIVE MG/DL

## 2021-05-12 PROCEDURE — 99212 OFFICE O/P EST SF 10 MIN: CPT | Performed by: OBSTETRICS & GYNECOLOGY

## 2021-05-12 NOTE — PROGRESS NOTES
Reason for visit: Routine OB visit at 30w1d     CC:  Here for routine OBV.  Patient reports normal fetal movement and has no symptoms of labor ( or otherwise), denies VB.  No signs or symptoms of pregnancy induced hypertension.    ROS: All systems reviewed and are negative with exception of the following    Vitals, urine, FHT, and exam noted also in the prenatal flow sheet    Exam  HEENT: NCAT, EMOI  Abdomen is soft and nontender.  Uterus is consistent with EGA  Ext are NT    Impression  Diagnoses and all orders for this visit:    1. Prenatal care in third trimester (Primary)    2. Non-smoker              Kick count instructions were reviewed and encouraged.  Labor signs and symptoms were reviewed.  Patient was encouraged to come to hospital or call for bleeding, leakage of fluid or any other concerns.    Preeclampsia warnings were also reviewed with the patient.      Ultrasound for next appt 4D    Yogesh Jean MD

## 2021-06-02 ENCOUNTER — ROUTINE PRENATAL (OUTPATIENT)
Dept: OBSTETRICS AND GYNECOLOGY | Facility: CLINIC | Age: 26
End: 2021-06-02

## 2021-06-02 VITALS — BODY MASS INDEX: 36.47 KG/M2 | DIASTOLIC BLOOD PRESSURE: 72 MMHG | WEIGHT: 193 LBS | SYSTOLIC BLOOD PRESSURE: 118 MMHG

## 2021-06-02 DIAGNOSIS — Z78.9 NON-SMOKER: ICD-10-CM

## 2021-06-02 DIAGNOSIS — Z34.93 PRENATAL CARE IN THIRD TRIMESTER: Primary | ICD-10-CM

## 2021-06-02 LAB
GLUCOSE UR STRIP-MCNC: NEGATIVE MG/DL
PROT UR STRIP-MCNC: NEGATIVE MG/DL

## 2021-06-02 PROCEDURE — 99213 OFFICE O/P EST LOW 20 MIN: CPT | Performed by: OBSTETRICS & GYNECOLOGY

## 2021-06-02 PROCEDURE — 90471 IMMUNIZATION ADMIN: CPT | Performed by: OBSTETRICS & GYNECOLOGY

## 2021-06-02 PROCEDURE — 90715 TDAP VACCINE 7 YRS/> IM: CPT | Performed by: OBSTETRICS & GYNECOLOGY

## 2021-06-02 NOTE — PROGRESS NOTES
Reason for visit: Routine OB visit at 33w1d     CC:  Here for routine OBV.  Patient reports normal fetal movement and has no symptoms of labor ( or otherwise), denies VB.  No signs or symptoms of pregnancy induced hypertension.    ROS: All systems reviewed and are negative with exception of the following    Vitals, urine, FHT, and exam noted also in the prenatal flow sheet    Exam  HEENT: NCAT, EMOI  Abdomen is soft and nontender.  Uterus is consistent with EGA  Ext are NT    Impression  Diagnoses and all orders for this visit:    1. Prenatal care in third trimester (Primary)    2. Non-smoker    Other orders  -     Tdap Vaccine Greater Than or Equal To 8yo IM              Kick count instructions were reviewed and encouraged.  Labor signs and symptoms were reviewed.  Patient was encouraged to come to hospital or call for bleeding, leakage of fluid or any other concerns.    Preeclampsia warnings were also reviewed with the patient.      Ultrasound for next appt not indicated    TDaP today    Yogesh Jean MD

## 2021-06-21 ENCOUNTER — ROUTINE PRENATAL (OUTPATIENT)
Dept: OBSTETRICS AND GYNECOLOGY | Facility: CLINIC | Age: 26
End: 2021-06-21

## 2021-06-21 VITALS — SYSTOLIC BLOOD PRESSURE: 108 MMHG | BODY MASS INDEX: 36.84 KG/M2 | DIASTOLIC BLOOD PRESSURE: 74 MMHG | WEIGHT: 195 LBS

## 2021-06-21 DIAGNOSIS — Z78.9 NON-SMOKER: ICD-10-CM

## 2021-06-21 DIAGNOSIS — Z28.39 RUBELLA NON-IMMUNE STATUS, ANTEPARTUM: Primary | ICD-10-CM

## 2021-06-21 DIAGNOSIS — Z34.93 PRENATAL CARE IN THIRD TRIMESTER: ICD-10-CM

## 2021-06-21 DIAGNOSIS — O09.899 RUBELLA NON-IMMUNE STATUS, ANTEPARTUM: Primary | ICD-10-CM

## 2021-06-21 LAB
GLUCOSE UR STRIP-MCNC: NEGATIVE MG/DL
PROT UR STRIP-MCNC: NEGATIVE MG/DL

## 2021-06-21 PROCEDURE — 99213 OFFICE O/P EST LOW 20 MIN: CPT | Performed by: OBSTETRICS & GYNECOLOGY

## 2021-06-21 NOTE — PROGRESS NOTES
Reason for visit: Routine OB visit at 35w6d     CC:  Here for routine OBV.  Patient reports normal fetal movement and has no symptoms of labor ( or otherwise), denies VB.  No signs or symptoms of pregnancy induced hypertension.    ROS: All systems reviewed and are negative with exception of the following    Vitals, urine, FHT, and exam noted also in the prenatal flow sheet    Exam  HEENT: NCAT, EMOI  Abdomen is soft and nontender.  Uterus is consistent with EGA  Ext are NT    Impression  Diagnoses and all orders for this visit:    1. Rubella non-immune status, antepartum (Primary)    2. Prenatal care in third trimester    3. Non-smoker              Kick count instructions were reviewed and encouraged.  Labor signs and symptoms were reviewed.  Patient was encouraged to come to hospital or call for bleeding, leakage of fluid or any other concerns.    Preeclampsia warnings were also reviewed with the patient.      Ultrasound for next appt not indicated    RNI discussed with patient    Yogesh Jean MD

## 2021-06-30 ENCOUNTER — ROUTINE PRENATAL (OUTPATIENT)
Dept: OBSTETRICS AND GYNECOLOGY | Facility: CLINIC | Age: 26
End: 2021-06-30

## 2021-06-30 VITALS — DIASTOLIC BLOOD PRESSURE: 68 MMHG | SYSTOLIC BLOOD PRESSURE: 118 MMHG | WEIGHT: 196 LBS | BODY MASS INDEX: 37.03 KG/M2

## 2021-06-30 DIAGNOSIS — O09.899 RUBELLA NON-IMMUNE STATUS, ANTEPARTUM: ICD-10-CM

## 2021-06-30 DIAGNOSIS — Z34.93 PRENATAL CARE IN THIRD TRIMESTER: Primary | ICD-10-CM

## 2021-06-30 DIAGNOSIS — Z28.39 RUBELLA NON-IMMUNE STATUS, ANTEPARTUM: ICD-10-CM

## 2021-06-30 DIAGNOSIS — Z78.9 NON-SMOKER: ICD-10-CM

## 2021-06-30 LAB
GLUCOSE UR STRIP-MCNC: NEGATIVE MG/DL
PROT UR STRIP-MCNC: NEGATIVE MG/DL

## 2021-06-30 PROCEDURE — 99213 OFFICE O/P EST LOW 20 MIN: CPT | Performed by: OBSTETRICS & GYNECOLOGY

## 2021-06-30 NOTE — PROGRESS NOTES
Reason for visit: Routine OB visit at 37w1d     CC:  Here for routine OBV.  Patient reports normal fetal movement and has no symptoms of labor ( or otherwise), denies VB.  No signs or symptoms of pregnancy induced hypertension.    ROS: All systems reviewed and are negative with exception of the following    Vitals, urine, FHT, and exam noted also in the prenatal flow sheet    Exam  HEENT: NCAT, EMOI  Abdomen is soft and nontender.  Uterus is consistent with EGA  Ext are NT    Impression  Diagnoses and all orders for this visit:    1. Prenatal care in third trimester (Primary)    2. Rubella non-immune status, antepartum    3. Non-smoker              Kick count instructions were reviewed and encouraged.  Labor signs and symptoms were reviewed.  Patient was encouraged to come to hospital or call for bleeding, leakage of fluid or any other concerns.    Preeclampsia warnings were also reviewed with the patient.      Ultrasound for next appt not indicated    Yogesh Jean MD

## 2021-07-07 ENCOUNTER — ROUTINE PRENATAL (OUTPATIENT)
Dept: OBSTETRICS AND GYNECOLOGY | Facility: CLINIC | Age: 26
End: 2021-07-07

## 2021-07-07 VITALS — BODY MASS INDEX: 37.6 KG/M2 | SYSTOLIC BLOOD PRESSURE: 120 MMHG | WEIGHT: 199 LBS | DIASTOLIC BLOOD PRESSURE: 78 MMHG

## 2021-07-07 DIAGNOSIS — Z34.93 PRENATAL CARE IN THIRD TRIMESTER: Primary | ICD-10-CM

## 2021-07-07 DIAGNOSIS — O09.899 RUBELLA NON-IMMUNE STATUS, ANTEPARTUM: ICD-10-CM

## 2021-07-07 DIAGNOSIS — Z78.9 NON-SMOKER: ICD-10-CM

## 2021-07-07 DIAGNOSIS — Z28.39 RUBELLA NON-IMMUNE STATUS, ANTEPARTUM: ICD-10-CM

## 2021-07-07 LAB
GLUCOSE UR STRIP-MCNC: NEGATIVE MG/DL
PROT UR STRIP-MCNC: NEGATIVE MG/DL

## 2021-07-07 PROCEDURE — 99213 OFFICE O/P EST LOW 20 MIN: CPT | Performed by: OBSTETRICS & GYNECOLOGY

## 2021-07-07 NOTE — PROGRESS NOTES
Reason for visit: Routine OB visit at 38w1d     CC:  Here for routine OBV.  Patient reports normal fetal movement and has no symptoms of labor ( or otherwise), denies VB.  No signs or symptoms of pregnancy induced hypertension.    ROS: All systems reviewed and are negative with exception of the following    Vitals, urine, FHT, and exam noted also in the prenatal flow sheet    Exam  HEENT: NCAT, EMOI  Abdomen is soft and nontender.  Uterus is consistent with EGA  Ext are NT    Impression  Diagnoses and all orders for this visit:    1. Prenatal care in third trimester (Primary)    2. Rubella non-immune status, antepartum    3. Non-smoker              Kick count instructions were reviewed and encouraged.  Labor signs and symptoms were reviewed.  Patient was encouraged to come to hospital or call for bleeding, leakage of fluid or any other concerns.    Preeclampsia warnings were also reviewed with the patient.      Ultrasound for next appt not indicated    Yogesh Jean MD

## 2021-07-10 ENCOUNTER — HOSPITAL ENCOUNTER (INPATIENT)
Facility: HOSPITAL | Age: 26
LOS: 2 days | Discharge: HOME | End: 2021-07-12
Attending: OBSTETRICS & GYNECOLOGY | Admitting: OBSTETRICS & GYNECOLOGY

## 2021-07-10 DIAGNOSIS — Z3A.38 38 WEEKS GESTATION OF PREGNANCY: Primary | ICD-10-CM

## 2021-07-10 PROBLEM — Z34.90 PREGNANCY: Status: ACTIVE | Noted: 2021-07-10

## 2021-07-10 LAB
A1 MICROGLOB PLACENTAL VAG QL: POSITIVE
ABO GROUP BLD: NORMAL
BLD GP AB SCN SERPL QL: NEGATIVE
DEPRECATED RDW RBC AUTO: 43 FL (ref 37–54)
ERYTHROCYTE [DISTWIDTH] IN BLOOD BY AUTOMATED COUNT: 14.1 % (ref 12.3–15.4)
HCT VFR BLD AUTO: 35.1 % (ref 34–46.6)
HGB BLD-MCNC: 11.6 G/DL (ref 12–15.9)
MCH RBC QN AUTO: 27.9 PG (ref 26.6–33)
MCHC RBC AUTO-ENTMCNC: 33 G/DL (ref 31.5–35.7)
MCV RBC AUTO: 84.4 FL (ref 79–97)
PLATELET # BLD AUTO: 225 10*3/MM3 (ref 140–450)
PMV BLD AUTO: 12.8 FL (ref 6–12)
RBC # BLD AUTO: 4.16 10*6/MM3 (ref 3.77–5.28)
RH BLD: POSITIVE
T&S EXPIRATION DATE: NORMAL
WBC # BLD AUTO: 10.41 10*3/MM3 (ref 3.4–10.8)

## 2021-07-10 PROCEDURE — 86900 BLOOD TYPING SEROLOGIC ABO: CPT | Performed by: OBSTETRICS & GYNECOLOGY

## 2021-07-10 PROCEDURE — S0260 H&P FOR SURGERY: HCPCS | Performed by: OBSTETRICS & GYNECOLOGY

## 2021-07-10 PROCEDURE — 86850 RBC ANTIBODY SCREEN: CPT | Performed by: OBSTETRICS & GYNECOLOGY

## 2021-07-10 PROCEDURE — 86901 BLOOD TYPING SEROLOGIC RH(D): CPT | Performed by: OBSTETRICS & GYNECOLOGY

## 2021-07-10 PROCEDURE — 84112 EVAL AMNIOTIC FLUID PROTEIN: CPT | Performed by: OBSTETRICS & GYNECOLOGY

## 2021-07-10 PROCEDURE — 85027 COMPLETE CBC AUTOMATED: CPT | Performed by: OBSTETRICS & GYNECOLOGY

## 2021-07-10 RX ORDER — BUTORPHANOL TARTRATE 1 MG/ML
1 INJECTION, SOLUTION INTRAMUSCULAR; INTRAVENOUS
Status: DISCONTINUED | OUTPATIENT
Start: 2021-07-10 | End: 2021-07-11

## 2021-07-10 RX ORDER — SODIUM CHLORIDE 0.9 % (FLUSH) 0.9 %
10 SYRINGE (ML) INJECTION EVERY 12 HOURS SCHEDULED
Status: DISCONTINUED | OUTPATIENT
Start: 2021-07-10 | End: 2021-07-11

## 2021-07-10 RX ORDER — LIDOCAINE HYDROCHLORIDE 10 MG/ML
5 INJECTION, SOLUTION EPIDURAL; INFILTRATION; INTRACAUDAL; PERINEURAL AS NEEDED
Status: DISCONTINUED | OUTPATIENT
Start: 2021-07-10 | End: 2021-07-11

## 2021-07-10 RX ORDER — OXYTOCIN/0.9 % SODIUM CHLORIDE 30/500 ML
2-20 PLASTIC BAG, INJECTION (ML) INTRAVENOUS
Status: DISCONTINUED | OUTPATIENT
Start: 2021-07-10 | End: 2021-07-11

## 2021-07-10 RX ORDER — TERBUTALINE SULFATE 1 MG/ML
0.25 INJECTION, SOLUTION SUBCUTANEOUS AS NEEDED
Status: DISCONTINUED | OUTPATIENT
Start: 2021-07-10 | End: 2021-07-11

## 2021-07-10 RX ORDER — SODIUM CHLORIDE 0.9 % (FLUSH) 0.9 %
10 SYRINGE (ML) INJECTION AS NEEDED
Status: DISCONTINUED | OUTPATIENT
Start: 2021-07-10 | End: 2021-07-11

## 2021-07-10 RX ORDER — LIDOCAINE HYDROCHLORIDE 20 MG/ML
20 INJECTION, SOLUTION INFILTRATION; PERINEURAL AS NEEDED
Status: DISCONTINUED | OUTPATIENT
Start: 2021-07-10 | End: 2021-07-11

## 2021-07-10 RX ORDER — ONDANSETRON 4 MG/1
4 TABLET, FILM COATED ORAL EVERY 6 HOURS PRN
Status: DISCONTINUED | OUTPATIENT
Start: 2021-07-10 | End: 2021-07-11

## 2021-07-10 RX ORDER — ONDANSETRON 2 MG/ML
4 INJECTION INTRAMUSCULAR; INTRAVENOUS EVERY 6 HOURS PRN
Status: DISCONTINUED | OUTPATIENT
Start: 2021-07-10 | End: 2021-07-11

## 2021-07-10 RX ORDER — SODIUM CHLORIDE, SODIUM LACTATE, POTASSIUM CHLORIDE, CALCIUM CHLORIDE 600; 310; 30; 20 MG/100ML; MG/100ML; MG/100ML; MG/100ML
125 INJECTION, SOLUTION INTRAVENOUS CONTINUOUS
Status: DISCONTINUED | OUTPATIENT
Start: 2021-07-10 | End: 2021-07-11

## 2021-07-10 RX ADMIN — SODIUM CHLORIDE, POTASSIUM CHLORIDE, SODIUM LACTATE AND CALCIUM CHLORIDE 125 ML/HR: 600; 310; 30; 20 INJECTION, SOLUTION INTRAVENOUS at 21:06

## 2021-07-10 RX ADMIN — OXYTOCIN-SODIUM CHLORIDE 0.9% IV SOLN 30 UNIT/500ML 2 MILLI-UNITS/MIN: 30-0.9/5 SOLUTION at 22:08

## 2021-07-11 PROCEDURE — 25010000002 BUTORPHANOL PER 1 MG: Performed by: OBSTETRICS & GYNECOLOGY

## 2021-07-11 PROCEDURE — 90471 IMMUNIZATION ADMIN: CPT | Performed by: OBSTETRICS & GYNECOLOGY

## 2021-07-11 PROCEDURE — 88307 TISSUE EXAM BY PATHOLOGIST: CPT | Performed by: OBSTETRICS & GYNECOLOGY

## 2021-07-11 PROCEDURE — 59410 OBSTETRICAL CARE: CPT | Performed by: OBSTETRICS & GYNECOLOGY

## 2021-07-11 PROCEDURE — 25010000002 ONDANSETRON PER 1 MG: Performed by: OBSTETRICS & GYNECOLOGY

## 2021-07-11 PROCEDURE — 90707 MMR VACCINE SC: CPT | Performed by: OBSTETRICS & GYNECOLOGY

## 2021-07-11 PROCEDURE — 25010000002 MEASLES, MUMPS & RUBELLA VAC RECONSTITUTED SOLUTION: Performed by: OBSTETRICS & GYNECOLOGY

## 2021-07-11 RX ORDER — ONDANSETRON 2 MG/ML
4 INJECTION INTRAMUSCULAR; INTRAVENOUS EVERY 6 HOURS PRN
Status: DISCONTINUED | OUTPATIENT
Start: 2021-07-11 | End: 2021-07-12 | Stop reason: HOSPADM

## 2021-07-11 RX ORDER — OXYTOCIN/0.9 % SODIUM CHLORIDE 30/500 ML
999 PLASTIC BAG, INJECTION (ML) INTRAVENOUS ONCE
Status: DISCONTINUED | OUTPATIENT
Start: 2021-07-11 | End: 2021-07-11 | Stop reason: HOSPADM

## 2021-07-11 RX ORDER — PROMETHAZINE HYDROCHLORIDE 25 MG/1
25 TABLET ORAL EVERY 6 HOURS PRN
Status: DISCONTINUED | OUTPATIENT
Start: 2021-07-11 | End: 2021-07-12 | Stop reason: HOSPADM

## 2021-07-11 RX ORDER — METHYLERGONOVINE MALEATE 0.2 MG/ML
200 INJECTION INTRAVENOUS ONCE AS NEEDED
Status: DISCONTINUED | OUTPATIENT
Start: 2021-07-11 | End: 2021-07-11

## 2021-07-11 RX ORDER — PRENATAL VIT/IRON FUM/FOLIC AC 27MG-0.8MG
1 TABLET ORAL DAILY
Status: DISCONTINUED | OUTPATIENT
Start: 2021-07-11 | End: 2021-07-12 | Stop reason: HOSPADM

## 2021-07-11 RX ORDER — MISOPROSTOL 200 UG/1
800 TABLET ORAL AS NEEDED
Status: DISCONTINUED | OUTPATIENT
Start: 2021-07-11 | End: 2021-07-11

## 2021-07-11 RX ORDER — IBUPROFEN 600 MG/1
600 TABLET ORAL EVERY 8 HOURS PRN
Status: DISCONTINUED | OUTPATIENT
Start: 2021-07-11 | End: 2021-07-12 | Stop reason: HOSPADM

## 2021-07-11 RX ORDER — HYDROCORTISONE 25 MG/G
1 CREAM TOPICAL AS NEEDED
Status: DISCONTINUED | OUTPATIENT
Start: 2021-07-11 | End: 2021-07-12 | Stop reason: HOSPADM

## 2021-07-11 RX ORDER — BISACODYL 10 MG
10 SUPPOSITORY, RECTAL RECTAL DAILY PRN
Status: DISCONTINUED | OUTPATIENT
Start: 2021-07-12 | End: 2021-07-12 | Stop reason: HOSPADM

## 2021-07-11 RX ORDER — SODIUM CHLORIDE 0.9 % (FLUSH) 0.9 %
1-10 SYRINGE (ML) INJECTION AS NEEDED
Status: DISCONTINUED | OUTPATIENT
Start: 2021-07-11 | End: 2021-07-12 | Stop reason: HOSPADM

## 2021-07-11 RX ORDER — ONDANSETRON 4 MG/1
4 TABLET, FILM COATED ORAL EVERY 6 HOURS PRN
Status: DISCONTINUED | OUTPATIENT
Start: 2021-07-11 | End: 2021-07-12 | Stop reason: HOSPADM

## 2021-07-11 RX ORDER — PROMETHAZINE HYDROCHLORIDE 12.5 MG/1
12.5 SUPPOSITORY RECTAL EVERY 6 HOURS PRN
Status: DISCONTINUED | OUTPATIENT
Start: 2021-07-11 | End: 2021-07-12 | Stop reason: HOSPADM

## 2021-07-11 RX ORDER — CALCIUM CARBONATE 200(500)MG
2 TABLET,CHEWABLE ORAL 3 TIMES DAILY PRN
Status: DISCONTINUED | OUTPATIENT
Start: 2021-07-11 | End: 2021-07-12 | Stop reason: HOSPADM

## 2021-07-11 RX ORDER — OXYTOCIN/0.9 % SODIUM CHLORIDE 30/500 ML
125 PLASTIC BAG, INJECTION (ML) INTRAVENOUS CONTINUOUS PRN
Status: DISCONTINUED | OUTPATIENT
Start: 2021-07-11 | End: 2021-07-11 | Stop reason: HOSPADM

## 2021-07-11 RX ORDER — OXYTOCIN/0.9 % SODIUM CHLORIDE 30/500 ML
250 PLASTIC BAG, INJECTION (ML) INTRAVENOUS CONTINUOUS
Status: ACTIVE | OUTPATIENT
Start: 2021-07-11 | End: 2021-07-11

## 2021-07-11 RX ORDER — OXYCODONE HYDROCHLORIDE AND ACETAMINOPHEN 5; 325 MG/1; MG/1
1 TABLET ORAL EVERY 4 HOURS PRN
Status: DISCONTINUED | OUTPATIENT
Start: 2021-07-11 | End: 2021-07-12 | Stop reason: HOSPADM

## 2021-07-11 RX ORDER — OXYCODONE AND ACETAMINOPHEN 10; 325 MG/1; MG/1
1 TABLET ORAL EVERY 4 HOURS PRN
Status: DISCONTINUED | OUTPATIENT
Start: 2021-07-11 | End: 2021-07-12 | Stop reason: HOSPADM

## 2021-07-11 RX ORDER — DIPHENHYDRAMINE HCL 25 MG
25 CAPSULE ORAL NIGHTLY PRN
Status: DISCONTINUED | OUTPATIENT
Start: 2021-07-11 | End: 2021-07-12 | Stop reason: HOSPADM

## 2021-07-11 RX ORDER — CARBOPROST TROMETHAMINE 250 UG/ML
250 INJECTION, SOLUTION INTRAMUSCULAR AS NEEDED
Status: DISCONTINUED | OUTPATIENT
Start: 2021-07-11 | End: 2021-07-11

## 2021-07-11 RX ORDER — DOCUSATE SODIUM 100 MG/1
100 CAPSULE, LIQUID FILLED ORAL 2 TIMES DAILY
Status: DISCONTINUED | OUTPATIENT
Start: 2021-07-11 | End: 2021-07-12 | Stop reason: HOSPADM

## 2021-07-11 RX ADMIN — ONDANSETRON 4 MG: 2 INJECTION INTRAMUSCULAR; INTRAVENOUS at 01:50

## 2021-07-11 RX ADMIN — IBUPROFEN 600 MG: 600 TABLET, FILM COATED ORAL at 22:04

## 2021-07-11 RX ADMIN — SODIUM CHLORIDE, POTASSIUM CHLORIDE, SODIUM LACTATE AND CALCIUM CHLORIDE 125 ML/HR: 600; 310; 30; 20 INJECTION, SOLUTION INTRAVENOUS at 02:20

## 2021-07-11 RX ADMIN — DOCUSATE SODIUM 100 MG: 100 CAPSULE ORAL at 22:04

## 2021-07-11 RX ADMIN — BUTORPHANOL TARTRATE 1 MG: 1 INJECTION, SOLUTION INTRAMUSCULAR; INTRAVENOUS at 01:27

## 2021-07-11 RX ADMIN — MEASLES, MUMPS, AND RUBELLA VIRUS VACCINE LIVE 0.5 ML: 1000; 12500; 1000 INJECTION, POWDER, LYOPHILIZED, FOR SUSPENSION SUBCUTANEOUS at 17:08

## 2021-07-11 RX ADMIN — IBUPROFEN 600 MG: 600 TABLET, FILM COATED ORAL at 14:32

## 2021-07-11 RX ADMIN — DOCUSATE SODIUM 100 MG: 100 CAPSULE ORAL at 09:53

## 2021-07-11 RX ADMIN — OXYTOCIN-SODIUM CHLORIDE 0.9% IV SOLN 30 UNIT/500ML 250 ML/HR: 30-0.9/5 SOLUTION at 03:45

## 2021-07-11 NOTE — PLAN OF CARE
Goal Outcome Evaluation:   Successful vaginal delivery of female infant at 0320. No epidural, no lacs, bleeding scant to light, fundus firm one below U.

## 2021-07-11 NOTE — H&P
Cory Wilder  : 1995  MRN: [id number]  CSN: [id number]    History and Physical    Subjective   Maida Wilder is a 26 y.o. year old  with an Estimated Date of Delivery: 21 currently at 38w4d presenting with leaking fluid.    Prenatal care has been with Dr. Darcie Fernandez.  It has been benign.    OB History    Para Term  AB Living   4 3 3 0 0 3   SAB TAB Ectopic Molar Multiple Live Births   0 0 0 0 0 3      # Outcome Date GA Lbr Robin/2nd Weight Sex Delivery Anes PTL Lv   4 Current            3 Term 06/10/17 38w4d / 00:05 3180 g (7 lb 0.2 oz) M Vag-Spont None N DALI      Name: SUDEEP WILDER      Apgar1: 7  Apgar5: 9   2 Term 10/07/15 39w0d  3090 g (6 lb 13 oz) M Vag-Spont Local N DALI      Name: Richard    1 Term 14 38w0d  2523 g (5 lb 9 oz) F Vag-Spont Local N DALI      Name: Omar     Past Medical History:   Diagnosis Date   • Hypertension     in first pregnancy     Past Surgical History:   Procedure Laterality Date   • TONSILLECTOMY         Current Facility-Administered Medications:   •  butorphanol (STADOL) injection 1 mg, 1 mg, Intravenous, Q3H PRN, Rebecca, Brissa, DO  •  lactated ringers bolus 1,000 mL, 1,000 mL, Intravenous, Once PRN, Rebecca, Brissa, DO  •  lactated ringers infusion, 125 mL/hr, Intravenous, Continuous, Rebecca, Brissa, DO  •  lidocaine (XYLOCAINE) 2% injection 20 mL, 20 mL, Injection, PRN, Rebecca, Brissa, DO  •  lidocaine PF 1% (XYLOCAINE) injection 5 mL, 5 mL, Intradermal, PRN, Rebecca, Brissa, DO  •  ondansetron (ZOFRAN) tablet 4 mg, 4 mg, Oral, Q6H PRN **OR** ondansetron (ZOFRAN) injection 4 mg, 4 mg, Intravenous, Q6H PRN, Rebecca, Brissa, DO  •  sodium chloride 0.9 % flush 10 mL, 10 mL, Intravenous, Q12H, Brissa Fernandez,   •  sodium chloride 0.9 % flush 10 mL, 10 mL, Intravenous, PRN, Brissa Fernandez,   •  terbutaline (BRETHINE) injection 0.25 mg, 0.25 mg, Subcutaneous, PRN,  "Brissa Fernandez DO    No Known Allergies  Social History    Tobacco Use      Smoking status: Former Smoker      Smokeless tobacco: Never Used    Review of Systems   Genitourinary: Positive for vaginal discharge. Negative for pelvic pain and vaginal bleeding.         Objective   /92 (BP Location: Right arm, Patient Position: Lying)   Pulse 116   Temp 98.2 °F (36.8 °C) (Temporal)   Resp 18   Ht 154.9 cm (61\")   Wt 92.2 kg (203 lb 3.2 oz)   LMP 10/12/2020 (Exact Date)   BMI 38.39 kg/m²   General: well developed; well nourished  no acute distress   Heart: Not performed.   Lungs: breathing is unlabored   Abdomen: soft, non-tender; no masses  no umbilical or inguinal hernias are present  no hepato-splenomegaly   FHT's: reactive, reassuring   Cervix: was checked (by me): 3 cm / 80 % / -2  EFW 7.5 pounds  Pelvis seems clinically adequate   Presentation: cephalic   Contractions: irregular - external monitors used     Prenatal Labs  Lab Results   Component Value Date    HGB 12.0 04/30/2021    HEPBSAG Negative 12/09/2020    ABORH B Rh Positive 10/07/2015    ABO B 12/09/2020    RH Positive 12/09/2020    ABSCRN Negative 12/09/2020    [id number] Non Reactive 12/09/2020    URINECX Final report 12/16/2016       Current Labs Reviewed   No data reviewed       Assessment   1. IUP at 38w4d  2. Group B strep status: negative  3. SROM confirmed with positive amnisure     Plan   1. Expectant management   2. Will recheck patient and if no change will start Pitocin   3. Discussed plan with patient and support person    Brissa Fernandez DO  7/10/2021  20:41 CDT       "

## 2021-07-11 NOTE — PAYOR COMM NOTE
"ADMIT INPT 7-10-21  UR  [phone]    Nory Wilder (26 y.o. Female)     Date of Birth Social Security Number Address Home Phone MRN    1995  [id number]   [place] [address] [phone] [id number]    Pentecostal Marital Status          None Single       Admission Date Admission Type Admitting Provider Attending Provider Department, Room/Bed    7/10/21 Urgent Brissa Fernandez DO Williamson, Katherine, DO [facility] MOTHER BABY 2A, M240/1    Discharge Date Discharge Disposition Discharge Destination                       Attending Provider: Brissa Fernandez DO    Allergies: No Known Allergies    Isolation: None   Infection: None   Code Status: CPR    Ht: 154.9 cm (61\")   Wt: 92.2 kg (203 lb 3.2 oz)    Admission Cmt: None   Principal Problem: None                Active Insurance as of 7/10/2021     Primary Coverage     Payor Plan Insurance Group Employer/Plan Group    [employer]      Payor Plan Address Payor Plan Phone Number Payor Plan Fax Number Effective Dates    [address] [address] [phone]  2016 - None Entered    [facility] [id number]       Subscriber Name Subscriber Birth Date Member ID       NORY WILDER 1995 [id number]                 Emergency Contacts      (Rel.) Home Phone Work Phone Mobile Phone    SANJAY BENITEZ (Spouse) [phone] -- --               History & Physical      Brissa Fernandez DO at 07/10/21 2041           Cory Wilder  : 1995  MRN: [id number]  CSN: [id number]    History and Physical    Subjective   Nory Wilder is a 26 y.o. year old  with an Estimated Date of Delivery: 21 currently at 38w4d presenting with leaking fluid.    Prenatal care has been with Dr. Darcie Fernandez.  It has been benign.    OB History    Para Term  AB Living   4 3 3 0 0 3   SAB TAB Ectopic Molar Multiple Live Births   0 0 0 0 0 3      # Outcome Date GA Lbr Robin/2nd Weight Sex " "Delivery Anes PTL Lv   4 Current            3 Term 06/10/17 38w4d / 00:05 3180 g (7 lb 0.2 oz) M Vag-Spont None N DALI      Name: SUDEEP WILDER      Apgar1: 7  Apgar5: 9   2 Term 10/07/15 39w0d  3090 g (6 lb 13 oz) M Vag-Spont Local N DALI      Name: Richard    1 Term 05/07/14 38w0d  2523 g (5 lb 9 oz) F Vag-Spont Local N DALI      Name: Omar     Past Medical History:   Diagnosis Date   • Hypertension     in first pregnancy     Past Surgical History:   Procedure Laterality Date   • TONSILLECTOMY         Current Facility-Administered Medications:   •  butorphanol (STADOL) injection 1 mg, 1 mg, Intravenous, Q3H PRN, Brissa Fernandez, DO  •  lactated ringers bolus 1,000 mL, 1,000 mL, Intravenous, Once PRN, Brissa Fernandez, DO  •  lactated ringers infusion, 125 mL/hr, Intravenous, Continuous, Brissa Fernandez, DO  •  lidocaine (XYLOCAINE) 2% injection 20 mL, 20 mL, Injection, PRN, Rebecca, Brissa, DO  •  lidocaine PF 1% (XYLOCAINE) injection 5 mL, 5 mL, Intradermal, PRN, Brissa Fernandez, DO  •  ondansetron (ZOFRAN) tablet 4 mg, 4 mg, Oral, Q6H PRN **OR** ondansetron (ZOFRAN) injection 4 mg, 4 mg, Intravenous, Q6H PRN, Brissa Fernandez, DO  •  sodium chloride 0.9 % flush 10 mL, 10 mL, Intravenous, Q12H, Rebecca, Brissa, DO  •  sodium chloride 0.9 % flush 10 mL, 10 mL, Intravenous, PRN, Rebecca, Brissa, DO  •  terbutaline (BRETHINE) injection 0.25 mg, 0.25 mg, Subcutaneous, PRN, Rebecca, Brissa, DO    No Known Allergies  Social History    Tobacco Use      Smoking status: Former Smoker      Smokeless tobacco: Never Used    Review of Systems   Genitourinary: Positive for vaginal discharge. Negative for pelvic pain and vaginal bleeding.         Objective   /92 (BP Location: Right arm, Patient Position: Lying)   Pulse 116   Temp 98.2 °F (36.8 °C) (Temporal)   Resp 18   Ht 154.9 cm (61\")   Wt 92.2 kg (203 lb 3.2 oz)   LMP 10/12/2020 (Exact Date)   BMI 38.39 " kg/m²   General: well developed; well nourished  no acute distress   Heart: Not performed.   Lungs: breathing is unlabored   Abdomen: soft, non-tender; no masses  no umbilical or inguinal hernias are present  no hepato-splenomegaly   FHT's: reactive, reassuring   Cervix: was checked (by me): 3 cm / 80 % / -2  EFW 7.5 pounds  Pelvis seems clinically adequate   Presentation: cephalic   Contractions: irregular - external monitors used     Prenatal Labs  Lab Results   Component Value Date    HGB 12.0 04/30/2021    HEPBSAG Negative 12/09/2020    ABORH B Rh Positive 10/07/2015    ABO B 12/09/2020    RH Positive 12/09/2020    ABSCRN Negative 12/09/2020    [id number] Non Reactive 12/09/2020    URINECX Final report 12/16/2016       Current Labs Reviewed   No data reviewed       Assessment   1. IUP at 38w4d  2. Group B strep status: negative  3. SROM confirmed with positive amnisure     Plan   1. Expectant management   2. Will recheck patient and if no change will start Pitocin   3. Discussed plan with patient and support person    Brissa Fernandez DO  7/10/2021  20:41 CDT         Electronically signed by Brissa Fernandez DO at 07/10/21 2044          Operative/Procedure Notes (all)      Brissa Fernandez DO at 07/11/21 0333  Version 2 of 2         [facility]  Vaginal Delivery Note    Delivery     Delivery: Vaginal, Spontaneous     YOB: 2019    Time of Birth:  Gestational Age 3:20 AM   38w5d     Anesthesia: None     Delivering clinician: Brissa Fernandez    Forceps?   No   Vacuum? No    Shoulder dystocia present: No        Delivery narrative:  Progressed to C/C/+2 and pushed well to deliver a LVIF. JOHN with compound left hand and loose nuchal delivered.  Infant initially not vigorous thus handed off to nursing. NICU evaluated baby. Placenta spontaneous and intact with 3VC after IV Pitocin. EBL 250mL. Moving all extremities. No complications. Sponge and needle count correct.        Infant     Findings: female  infant     Infant observations: Weight: 3100 g (6 lb 13.4 oz)   Length: 19.25  in  Observations/Comments:  HC 33cm; AGA      Apgars: 7  @ 1 minute /    9  @ 5 minutes   Infant Name: Argelia     Placenta, Cord, and Fluid    Placenta delivered  Spontaneous  at   7/11/2021  3:24 AM     Cord: 3 vessels  present.   Nuchal Cord?  yes; Number of nuchal loops present:  1    Cord blood obtained: No    Cord gases obtained:  Yes    Cord gas results: Venous:  No results found for: PHCVEN    Arterial:  No results found for: PHCART     Repair    Episiotomy: None     No    Lacerations: No   Estimated Blood Loss:             Complications  none    Disposition  Mother to Remain in LD  in stable condition currently.  Baby to remains with mom  in stable condition currently.      Brissa Fernandez DO  07/11/21  05:36 CDT        Electronically signed by Brissa Fernandez DO at 07/11/21 0536     Brissa Fernandez DO at 07/11/21 0333  Version 1 of 2         [facility]  Vaginal Delivery Note    Delivery     Delivery: Vaginal, Spontaneous     YOB: 2019    Time of Birth:  Gestational Age 3:20 AM   38w5d     Anesthesia: None     Delivering clinician: Brissa Fernandez    Forceps?   No   Vacuum? No    Shoulder dystocia present: No        Delivery narrative:  Progressed to C/C/+2 and pushed well to deliver a LVIF. JOHN with compound left hand and loose nuchal delivered.  Infant initially not vigorous thus handed off to nursing. NICU evaluated baby. Placenta spontaneous and intact with 3VC after IV Pitocin. EBL 250mL. Moving all extremities. No complications. Sponge and needle count correct.        Infant    Findings: female  infant     Infant observations: Weight: No birth weight on file.   Length:   in  Observations/Comments:        Apgars:   @ 1 minute /      @ 5 minutes   Infant Name: Argelia     Placenta, Cord, and Fluid    Placenta delivered  Spontaneous  at   7/11/2021  3:24 AM     Cord:  3 vessels  present.   Nuchal Cord?  yes; Number of nuchal loops present:  1    Cord blood obtained: No    Cord gases obtained:  Yes    Cord gas results: Venous:  No results found for: PHCVEN    Arterial:  No results found for: PHCART     Repair    Episiotomy: None     No    Lacerations: No   Estimated Blood Loss:             Complications  none    Disposition  Mother to Remain in LD  in stable condition currently.  Baby to remains with mom  in stable condition currently.      Brissa Fernandez DO  21  03:33 CDT        Electronically signed by Brissa Fernandez DO at 21 0335          Physician Progress Notes (last 48 hours) (Notes from 21 1136 through 21 1136)      Brissa Fernandez DO at 21 0258           Cory GOODMAN Day  : 1995  MRN: [id number]  CSN: [id number]    Labor progress note    Subjective   She reports is feeling painful contraction and does not want an epidural    Objective   Min/max vitals past 24 hours:  Temp  Min: 98.2 °F (36.8 °C)  Max: 98.2 °F (36.8 °C)   BP  Min: 108/62  Max: 134/92   Pulse  Min: 82  Max: 116   Resp  Min: 18  Max: 18        FHT's: reactive, reassuring and category 1.  external monitors used   Cervix: was checked (by me): 6 cm / 90 % / -2   Contractions: regular every 2-3 minutes - external monitors used     Assessment   1. IUP at 38w5d  2. SROM   3. GBS negative     Plan   1.   AROM - forebag clear fluid seen  Allow labor to continue pending maternal and fetal status  Plan discussed with family and questions answered.  Understanding verbalized.    Brissa Fernandez DO  2021  02:58 CDT             Electronically signed by Brissa Fernandez DO at 21 4593

## 2021-07-11 NOTE — L&D DELIVERY NOTE
[facility]  Vaginal Delivery Note    Delivery     Delivery: Vaginal, Spontaneous     YOB: 2019    Time of Birth:  Gestational Age 3:20 AM   38w5d     Anesthesia: None     Delivering clinician: Brissa Fernandez    Forceps?   No   Vacuum? No    Shoulder dystocia present: No        Delivery narrative:  Progressed to C/C/+2 and pushed well to deliver a LVIF. JOHN with compound left hand and loose nuchal delivered.  Infant initially not vigorous thus handed off to nursing. NICU evaluated baby. Placenta spontaneous and intact with 3VC after IV Pitocin. EBL 250mL. Moving all extremities. No complications. Sponge and needle count correct.        Infant    Findings: female  infant     Infant observations: Weight: 3100 g (6 lb 13.4 oz)   Length: 19.25  in  Observations/Comments:  HC 33cm; AGA      Apgars: 7  @ 1 minute /    9  @ 5 minutes   Infant Name: Argelia     Placenta, Cord, and Fluid    Placenta delivered  Spontaneous  at   7/11/2021  3:24 AM     Cord: 3 vessels  present.   Nuchal Cord?  yes; Number of nuchal loops present:  1    Cord blood obtained: No    Cord gases obtained:  Yes    Cord gas results: Venous:  No results found for: PHCVEN    Arterial:  No results found for: PHCART     Repair    Episiotomy: None     No    Lacerations: No   Estimated Blood Loss:             Complications  none    Disposition  Mother to Remain in LD  in stable condition currently.  Baby to remains with mom  in stable condition currently.      Brissa Fernandez DO  07/11/21  05:36 CDT

## 2021-07-11 NOTE — PROGRESS NOTES
NIEVES Cory Bey D Day  : 1995  MRN: [id number]  CSN: [id number]    Labor progress note    Subjective   She reports is feeling painful contraction and does not want an epidural     Objective   Min/max vitals past 24 hours:  Temp  Min: 98.2 °F (36.8 °C)  Max: 98.2 °F (36.8 °C)   BP  Min: 108/62  Max: 134/92   Pulse  Min: 82  Max: 116   Resp  Min: 18  Max: 18        FHT's: reactive, reassuring and category 1.  external monitors used   Cervix: was checked (by me): 6 cm / 90 % / -2   Contractions: regular every 2-3 minutes - external monitors used      Assessment   1. IUP at 38w5d  2. SROM   3. GBS negative      Plan   1.   AROM - forebag clear fluid seen  Allow labor to continue pending maternal and fetal status  Plan discussed with family and questions answered.  Understanding verbalized.    Brissa Fernandez DO  2021  02:58 CDT

## 2021-07-11 NOTE — PLAN OF CARE
Goal Outcome Evaluation:  Plan of Care Reviewed With: patient, caregiver        Progress: improving     VVS.  Fundus F, ML, U1.  Small lochia.  PO Motrin controlling pain.  Formula feeding and bonding appr with infant.  Pt is rubella non-immune, order to give MMR prior to dc.  Problem: Adult Inpatient Plan of Care  Goal: Plan of Care Review  Outcome: Ongoing, Progressing  Flowsheets (Taken 7/11/2021 1614)  Progress: improving  Plan of Care Reviewed With:   patient   caregiver

## 2021-07-12 VITALS
BODY MASS INDEX: 38.36 KG/M2 | HEART RATE: 90 BPM | TEMPERATURE: 98.4 F | OXYGEN SATURATION: 99 % | RESPIRATION RATE: 16 BRPM | SYSTOLIC BLOOD PRESSURE: 120 MMHG | HEIGHT: 61 IN | DIASTOLIC BLOOD PRESSURE: 67 MMHG | WEIGHT: 203.2 LBS

## 2021-07-12 LAB
BASOPHILS # BLD AUTO: 0.03 10*3/MM3 (ref 0–0.2)
BASOPHILS NFR BLD AUTO: 0.2 % (ref 0–1.5)
DEPRECATED RDW RBC AUTO: 45.1 FL (ref 37–54)
EOSINOPHIL # BLD AUTO: 0.18 10*3/MM3 (ref 0–0.4)
EOSINOPHIL NFR BLD AUTO: 1.5 % (ref 0.3–6.2)
ERYTHROCYTE [DISTWIDTH] IN BLOOD BY AUTOMATED COUNT: 14.4 % (ref 12.3–15.4)
HCT VFR BLD AUTO: 36.7 % (ref 34–46.6)
HGB BLD-MCNC: 11.7 G/DL (ref 12–15.9)
IMM GRANULOCYTES # BLD AUTO: 0.05 10*3/MM3 (ref 0–0.05)
IMM GRANULOCYTES NFR BLD AUTO: 0.4 % (ref 0–0.5)
LYMPHOCYTES # BLD AUTO: 3.34 10*3/MM3 (ref 0.7–3.1)
LYMPHOCYTES NFR BLD AUTO: 27.6 % (ref 19.6–45.3)
MCH RBC QN AUTO: 28.1 PG (ref 26.6–33)
MCHC RBC AUTO-ENTMCNC: 31.9 G/DL (ref 31.5–35.7)
MCV RBC AUTO: 88 FL (ref 79–97)
MONOCYTES # BLD AUTO: 0.71 10*3/MM3 (ref 0.1–0.9)
MONOCYTES NFR BLD AUTO: 5.9 % (ref 5–12)
NEUTROPHILS NFR BLD AUTO: 64.4 % (ref 42.7–76)
NEUTROPHILS NFR BLD AUTO: 7.79 10*3/MM3 (ref 1.7–7)
NRBC BLD AUTO-RTO: 0 /100 WBC (ref 0–0.2)
PLATELET # BLD AUTO: 215 10*3/MM3 (ref 140–450)
PMV BLD AUTO: 13 FL (ref 6–12)
RBC # BLD AUTO: 4.17 10*6/MM3 (ref 3.77–5.28)
WBC # BLD AUTO: 12.1 10*3/MM3 (ref 3.4–10.8)

## 2021-07-12 PROCEDURE — 85025 COMPLETE CBC W/AUTO DIFF WBC: CPT | Performed by: OBSTETRICS & GYNECOLOGY

## 2021-07-12 PROCEDURE — 0503F POSTPARTUM CARE VISIT: CPT | Performed by: OBSTETRICS & GYNECOLOGY

## 2021-07-12 RX ORDER — OXYCODONE HYDROCHLORIDE AND ACETAMINOPHEN 5; 325 MG/1; MG/1
1 TABLET ORAL EVERY 4 HOURS PRN
Qty: 5 TABLET | Refills: 0 | Status: SHIPPED | OUTPATIENT
Start: 2021-07-12 | End: 2021-07-18

## 2021-07-12 RX ADMIN — IBUPROFEN 600 MG: 600 TABLET, FILM COATED ORAL at 08:03

## 2021-07-12 NOTE — DISCHARGE SUMMARY
Discharge Summary     Cory GOODMAN Day  : 1995  MRN: [id number]  CSN: [id number]    Date of Admission: 7/10/2021   Date of Discharge:  2021   Delivering Physician: Brissa Fernandez        Admission Diagnosis: 1. Pregnancy [Z34.90]   Discharge Diagnosis: 1. Pregnancy at 38w5d - delivered       Procedures: 2021  - Vaginal, Spontaneous       Hospital Course  Patient is a 26 y.o.  who at 38w5d had a vaginal birth.  Her postpartum course was without complications.  On PPD #2 she was ready for discharge.  She had normal lochia and pain well controlled with oral medications.    Infant  female  fetus weighing 3100 g (6 lb 13.4 oz)   Apgars -  7 @ 1 minute /  9 @ 5 minutes.    Discharge labs  Lab Results   Component Value Date    WBC 12.10 (H) 2021    HGB 11.7 (L) 2021    HCT 36.7 2021     2021       Discharge Medications     Discharge Medications      New Medications      Instructions Start Date   oxyCODONE-acetaminophen 5-325 MG per tablet  Commonly known as: PERCOCET   1 tablet, Oral, Every 4 Hours PRN         Continue These Medications      Instructions Start Date   PRENATAL 1 PO   Oral             Discharge Disposition Home or Self Care   Condition on Discharge: good   Follow-up: 4 weeks with Ted to schedule tubal ligation.     Brissa Fernandez,   2021

## 2021-07-12 NOTE — PROGRESS NOTES
[facility]  [facility]    Subjective   Postpartum Day 1: Vaginal Delivery    The patient feels well.  Her pain is well controlled with nonsteroidal anti-inflammatory drugs.   She is ambulating well.  Patient describes her bleeding as thin lochia.    Breastfeeding: declines.    Objective     Vital Signs Range for the last 24 hours  Temperature: Temp:  [97.8 °F (36.6 °C)-98.2 °F (36.8 °C)] 97.8 °F (36.6 °C)   Temp Source: Temp src: Oral   BP: BP: (105-125)/(62-67) 105/62   Pulse: Heart Rate:  [73-92] 73   Respirations: Resp:  [16-18] 18   SPO2: SpO2:  [98 %-100 %] 98 %   O2 Amount (l/min):     O2 Devices Device (Oxygen Therapy): room air   Weight:       Admit Height:  Height: 154.9 cm (61\")      Physical Exam:  General:  no acute distresss.  Abdomen: abdomen is soft without significant tenderness, masses, organomegaly or guarding. Fundus: appropriate, firm, non tender  Extremities: normal, atraumatic, no cyanosis, and trace edema.     Lab results reviewed:  Yes   Rubella:  No results found for: RUBELLAIGGIN Nurse Transcribed from prenatal record --  No components found for: EXTRUBELQUAL  Rh Status:    RH type   Date Value Ref Range Status   07/10/2021 Positive  Final     Immunizations:   Immunization History   Administered Date(s) Administered   • MMR 07/11/2021   • Tdap 03/29/2017, 06/02/2021     Lab Results (last 24 hours)     Procedure Component Value Units Date/Time    CBC & Differential [[id number]]  (Abnormal) Collected: 07/12/21 0640    Specimen: Blood Updated: 07/12/21 0710    Narrative:      The following orders were created for panel order CBC & Differential.  Procedure                               Abnormality         Status                     ---------                               -----------         ------                     CBC Auto Differential[[id number]]        Abnormal            Final result                 Please view results for these tests on the individual orders.    CBC " Auto Differential [[id number]]  (Abnormal) Collected: 07/12/21 0640    Specimen: Blood Updated: 07/12/21 0710     WBC 12.10 10*3/mm3      RBC 4.17 10*6/mm3      Hemoglobin 11.7 g/dL      Hematocrit 36.7 %      MCV 88.0 fL      MCH 28.1 pg      MCHC 31.9 g/dL      RDW 14.4 %      RDW-SD 45.1 fl      MPV 13.0 fL      Platelets 215 10*3/mm3      Neutrophil % 64.4 %      Lymphocyte % 27.6 %      Monocyte % 5.9 %      Eosinophil % 1.5 %      Basophil % 0.2 %      Immature Grans % 0.4 %      Neutrophils, Absolute 7.79 10*3/mm3      Lymphocytes, Absolute 3.34 10*3/mm3      Monocytes, Absolute 0.71 10*3/mm3      Eosinophils, Absolute 0.18 10*3/mm3      Basophils, Absolute 0.03 10*3/mm3      Immature Grans, Absolute 0.05 10*3/mm3      nRBC 0.0 /100 WBC           Assessment/Plan       Pregnancy      Maida D Day is Day 1  post-partum  Vaginal, Spontaneous  None .      Plan:  Continue current care.      Brissa Fernandez DO  7/12/2021  07:48 CDT

## 2021-07-12 NOTE — PAYOR COMM NOTE
"Ref:[id number]    [facility]  [place],  [phone]  Or  Fax   [phone]       Tina Nory GOODMAN (26 y.o. Female)     Date of Birth Social Security Number Address Home Phone MRN    1995  [address]  [place] [address] [phone] [id number]    Confucianist Marital Status          Other Single       Admission Date Admission Type Admitting Provider Attending Provider Department, Room/Bed    7/10/21 Urgent Brissa Fernandez DO  [facility] MOTHER BABY 2A, M240/1    Discharge Date Discharge Disposition Discharge Destination        2021 Home or Self Care              Attending Provider: (none)   Allergies: No Known Allergies    Isolation: None   Infection: None   Code Status: Prior    Ht: 154.9 cm (61\")   Wt: 92.2 kg (203 lb 3.2 oz)    Admission Cmt: None   Principal Problem: None                Active Insurance as of 7/10/2021     Primary Coverage     Payor Plan Insurance Group Employer/Plan Group    [place]      Payor Plan Address Payor Plan Phone Number Payor Plan Fax Number Effective Dates    [address] [address] [phone]  2016 - None Entered    [facility] [id number]       Subscriber Name Subscriber Birth Date Member ID       TINANORMANORY D 1995 [id number]                 Emergency Contacts      (Rel.) Home Phone Work Phone Mobile Phone    EMMANUELSANJAY GRIMM (Spouse) [phone] -- --               Discharge Summary      Brissa Fernandez DO at 21 0752          Discharge Summary     Cory  Nory GOODMAN Day  : 1995  MRN: [id number]  CSN: [id number]    Date of Admission: 7/10/2021   Date of Discharge:  2021   Delivering Physician: Brissa Fernandez        Admission Diagnosis: 1. Pregnancy [Z34.90]   Discharge Diagnosis: 1. Pregnancy at 38w5d - delivered       Procedures: 2021  - Vaginal, Spontaneous       Hospital Course  Patient is a 26 y.o.  who at 38w5d had a vaginal birth.  Her " postpartum course was without complications.  On PPD #2 she was ready for discharge.  She had normal lochia and pain well controlled with oral medications.    Infant  female  fetus weighing 3100 g (6 lb 13.4 oz)   Apgars -  7 @ 1 minute /  9 @ 5 minutes.    Discharge labs  Lab Results   Component Value Date    WBC 12.10 (H) 07/12/2021    HGB 11.7 (L) 07/12/2021    HCT 36.7 07/12/2021     07/12/2021       Discharge Medications     Discharge Medications      New Medications      Instructions Start Date   oxyCODONE-acetaminophen 5-325 MG per tablet  Commonly known as: PERCOCET   1 tablet, Oral, Every 4 Hours PRN         Continue These Medications      Instructions Start Date   PRENATAL 1 PO   Oral             Discharge Disposition Home or Self Care   Condition on Discharge: good   Follow-up: 4 weeks with [facility] to schedule tubal ligation.     Brent Garcia DO  7/12/2021      Electronically signed by Brent Garcia DO at 07/12/21 0753       Discharge Order (From admission, onward)     Start     Ordered    07/12/21 0751  Discharge patient  Once     Expected Discharge Date: 07/12/21    Expected Discharge Time: Midday    Discharge Disposition: Home or Self Care    Physician of Record for Attribution - Please select from Treatment Team: BRENT GARCIA [[id number]]    Review needed by CMO to determine Physician of Record: No       Question Answer Comment   Physician of Record for Attribution - Please select from Treatment Team BRENT GARCIA    Review needed by CMO to determine Physician of Record No        07/12/21 0752

## 2021-07-17 LAB
CYTO UR: NORMAL
LAB AP CASE REPORT: NORMAL
PATH REPORT.FINAL DX SPEC: NORMAL
PATH REPORT.GROSS SPEC: NORMAL

## 2021-08-09 ENCOUNTER — POSTPARTUM VISIT (OUTPATIENT)
Dept: OBSTETRICS AND GYNECOLOGY | Facility: CLINIC | Age: 26
End: 2021-08-09

## 2021-08-09 VITALS
HEIGHT: 61 IN | SYSTOLIC BLOOD PRESSURE: 128 MMHG | DIASTOLIC BLOOD PRESSURE: 88 MMHG | WEIGHT: 190 LBS | BODY MASS INDEX: 35.87 KG/M2

## 2021-08-09 DIAGNOSIS — Z30.011 ENCOUNTER FOR INITIAL PRESCRIPTION OF CONTRACEPTIVE PILLS: ICD-10-CM

## 2021-08-09 DIAGNOSIS — F17.200 SMOKER: ICD-10-CM

## 2021-08-09 DIAGNOSIS — Z30.09 CONSULTATION FOR FEMALE STERILIZATION: ICD-10-CM

## 2021-08-09 PROBLEM — O09.899 RUBELLA NON-IMMUNE STATUS, ANTEPARTUM: Status: RESOLVED | Noted: 2020-12-12 | Resolved: 2021-08-09

## 2021-08-09 PROBLEM — Z34.90 PREGNANCY: Status: RESOLVED | Noted: 2021-07-10 | Resolved: 2021-08-09

## 2021-08-09 PROBLEM — Z34.92 ENCOUNTER FOR SUPERVISION OF NORMAL PREGNANCY IN SECOND TRIMESTER: Status: RESOLVED | Noted: 2017-01-11 | Resolved: 2021-08-09

## 2021-08-09 PROBLEM — Z28.39 RUBELLA NON-IMMUNE STATUS, ANTEPARTUM: Status: RESOLVED | Noted: 2020-12-12 | Resolved: 2021-08-09

## 2021-08-09 PROCEDURE — 0503F POSTPARTUM CARE VISIT: CPT | Performed by: OBSTETRICS & GYNECOLOGY

## 2021-08-09 RX ORDER — SODIUM CHLORIDE 9 MG/ML
100 INJECTION, SOLUTION INTRAVENOUS CONTINUOUS
Status: CANCELLED | OUTPATIENT
Start: 2021-08-09

## 2021-08-09 RX ORDER — NORETHINDRONE ACETATE AND ETHINYL ESTRADIOL 1MG-20(21)
1 KIT ORAL DAILY
Qty: 28 TABLET | Refills: 3 | Status: SHIPPED | OUTPATIENT
Start: 2021-08-09 | End: 2021-08-27 | Stop reason: HOSPADM

## 2021-08-09 NOTE — H&P
Yogesh Jean MD  [facility]  [address]  [place] [address]  Office [phone]  Fax [phone]      [facility] Cory Delacruz  1995  [id number]  [id number]  2021    Abena Delacruz is a 26 y.o. year old female  who presents for surgery due to Desire for Permanent Sterilization.  Failed conservative measures include N/A.    COEMIG Procedure no  Rating of Pain none  Effect on daily activities none    HPI    Risks, benefits, and alternatives of permanent sterilization were discussed with the patient in detail. Intraoperative risks of bleeding and damage to surrounding organs, including but not limited to intestine, bladder and ureter, were explained. Management of these were also explained. Postoperative complications such as infection, pneumonia, DVT, and bleeding were explained. The importance of compliance with postoperative restrictions was discussed. Laparoscopic versus hysteroscopic options were discussed. In regards to Essure, adequate contraception until hysterosalpingogram confirms tubal blockage was explained. Success and failure rates were discussed. Increased risk of ectopic pregnancy was explained. In addition, reversible forms of contraception were reviewed such as the pill, the patch, the shot, and the IUD.     Specifically, bilateral salpingectomy was discussed.  Reduction in fallopian tube cancer was discussed as well as potential decrease in ovarian cancer risk discussed.  Irreversible nature of this approach as well as the need for at least one additional laparoscopic incision was discussed.    Recent concerns regarding the Essure procedure were reviewed as well as the management of those issues, if they were to occur, were explained in detail.    All of the patient's questions were answered to her satisfaction. She was encouraged to return for an additional appointment if she had further questions. She verbalized understanding of  the above and wished to proceed with the outlined plan.    Desires bilateral salpingectomy    Past Medical History:   Diagnosis Date   • Hypertension     in first pregnancy       Past Surgical History:   Procedure Laterality Date   • TONSILLECTOMY           Current Outpatient Medications:   •  Prenatal MV-Min-Fe Fum-FA-DHA (PRENATAL 1 PO), Take  by mouth., Disp: , Rfl:   •  norethindrone-ethinyl estradiol FE (Junel FE 1/20) 1-20 MG-MCG per tablet, Take 1 tablet by mouth Daily., Disp: 28 tablet, Rfl: 3    No Known Allergies    Family History   Problem Relation Age of Onset   • Colon cancer Mother    • Breast cancer Neg Hx    • Ovarian cancer Neg Hx    • Uterine cancer Neg Hx        Social History     Socioeconomic History   • Marital status: Single     Spouse name: Not on file   • Number of children: Not on file   • Years of education: Not on file   • Highest education level: Not on file   Tobacco Use   • Smoking status: Former Smoker   • Smokeless tobacco: Never Used   Vaping Use   • Vaping Use: Never used   Substance and Sexual Activity   • Alcohol use: No   • Drug use: No   • Sexual activity: Yes     Partners: Male     Birth control/protection: None       OB History    Para Term  AB Living   4 4 4 0 0 4   SAB TAB Ectopic Molar Multiple Live Births   0 0 0 0 0 4      # Outcome Date GA Lbr Robin/2nd Weight Sex Delivery Anes PTL Lv   4 Term 21 38w5d 18:19 / 00:01 3100 g (6 lb 13.4 oz) F Vag-Spont None N DALI      Birth Comments: HC 33cm; AGA      Name: CORY WILDERREJI      Apgar1: 7  Apgar5: 9   3 Term 06/10/17 38w4d / 00:05 3180 g (7 lb 0.2 oz) M Vag-Spont None N DALI      Name: HAYLIE WILDERBOELISABETH      Apgar1: 7  Apgar5: 9   2 Term 10/07/15 39w0d  3090 g (6 lb 13 oz) M Vag-Spont Local N DALI      Name: Richard    1 Term 14 38w0d  2523 g (5 lb 9 oz) F Vag-Spont Local N DALI      Name: Omar       Review of Systems   All other systems reviewed and are negative.         Objective   Vitals:     08/09/21 1106   BP: 128/88       Physical Exam  Vitals and nursing note reviewed. Exam conducted with a chaperone present.   Constitutional:       Appearance: Normal appearance.   HENT:      Head: Normocephalic and atraumatic.   Abdominal:      General: Abdomen is flat. Bowel sounds are normal.      Palpations: Abdomen is soft.   Musculoskeletal:         General: Normal range of motion.      Cervical back: Normal range of motion and neck supple.   Skin:     General: Skin is warm and dry.   Neurological:      General: No focal deficit present.      Mental Status: She is alert and oriented to person, place, and time. Mental status is at baseline.   Psychiatric:         Mood and Affect: Mood normal.         Behavior: Behavior normal.         Thought Content: Thought content normal.         Judgment: Judgment normal.            Assessment/Plan   Assessment/Plan:  Diagnoses and all orders for this visit:    1. Postpartum examination following vaginal delivery (Primary)    2. Smoker    3. Encounter for initial prescription of contraceptive pills    4. Consultation for female sterilization  -     Case Request  -     sodium chloride 0.9 % infusion  -     ceFAZolin (ANCEF) 2 g in sodium chloride 0.9 % 100 mL IVPB    Other orders  -     norethindrone-ethinyl estradiol FE (Junel FE 1/20) 1-20 MG-MCG per tablet; Take 1 tablet by mouth Daily.  Dispense: 28 tablet; Refill: 3  -     Follow Anesthesia Guidelines / Standing Orders; Future  -     Obtain informed consent  -     Provide NPO Instructions to Patient; Future  -     Chlorhexidine Skin Prep; Future  -     Follow Anesthesia Guidelines / Standing Orders; Standing  -     Verify NPO Status; Standing  -     Obtain informed consent; Standing  -     SCD (sequential compression device)- to be placed on patient in Pre-op; Standing  -     Clip operative site; Standing  -     Have patient void prior to transfer; Standing  -     Verify / Perform Chlorhexidine Skin Prep; Standing  -      Verify / Perform Chlorhexidine Skin Prep if Indicated (If Not Already Completed); Standing  -     Instructions on coughing, deep breathing, and incentive spirometry.; Standing        The risks, benefits, and alternatives of the procedure; along with the risks of anesthesia was discussed in full with the patient and all questions were answered.    Plan SALPINGECTOMY LAPAROSCOPIC for sterilization    Yogesh Jean MD'

## 2021-08-09 NOTE — H&P (VIEW-ONLY)
Yogesh Jean MD  [facility]  [address]  [place] [address]  Office [phone]  Fax [phone]      [facility] Cory Delacruz  1995  [id number]  [id number]  2021    Abena Delacruz is a 26 y.o. year old female  who presents for surgery due to Desire for Permanent Sterilization.  Failed conservative measures include N/A.    COEMIG Procedure no  Rating of Pain none  Effect on daily activities none    HPI    Risks, benefits, and alternatives of permanent sterilization were discussed with the patient in detail. Intraoperative risks of bleeding and damage to surrounding organs, including but not limited to intestine, bladder and ureter, were explained. Management of these were also explained. Postoperative complications such as infection, pneumonia, DVT, and bleeding were explained. The importance of compliance with postoperative restrictions was discussed. Laparoscopic versus hysteroscopic options were discussed. In regards to Essure, adequate contraception until hysterosalpingogram confirms tubal blockage was explained. Success and failure rates were discussed. Increased risk of ectopic pregnancy was explained. In addition, reversible forms of contraception were reviewed such as the pill, the patch, the shot, and the IUD.     Specifically, bilateral salpingectomy was discussed.  Reduction in fallopian tube cancer was discussed as well as potential decrease in ovarian cancer risk discussed.  Irreversible nature of this approach as well as the need for at least one additional laparoscopic incision was discussed.    Recent concerns regarding the Essure procedure were reviewed as well as the management of those issues, if they were to occur, were explained in detail.    All of the patient's questions were answered to her satisfaction. She was encouraged to return for an additional appointment if she had further questions. She verbalized understanding of  the above and wished to proceed with the outlined plan.    Desires bilateral salpingectomy    Past Medical History:   Diagnosis Date   • Hypertension     in first pregnancy       Past Surgical History:   Procedure Laterality Date   • TONSILLECTOMY           Current Outpatient Medications:   •  Prenatal MV-Min-Fe Fum-FA-DHA (PRENATAL 1 PO), Take  by mouth., Disp: , Rfl:   •  norethindrone-ethinyl estradiol FE (Junel FE 1/20) 1-20 MG-MCG per tablet, Take 1 tablet by mouth Daily., Disp: 28 tablet, Rfl: 3    No Known Allergies    Family History   Problem Relation Age of Onset   • Colon cancer Mother    • Breast cancer Neg Hx    • Ovarian cancer Neg Hx    • Uterine cancer Neg Hx        Social History     Socioeconomic History   • Marital status: Single     Spouse name: Not on file   • Number of children: Not on file   • Years of education: Not on file   • Highest education level: Not on file   Tobacco Use   • Smoking status: Former Smoker   • Smokeless tobacco: Never Used   Vaping Use   • Vaping Use: Never used   Substance and Sexual Activity   • Alcohol use: No   • Drug use: No   • Sexual activity: Yes     Partners: Male     Birth control/protection: None       OB History    Para Term  AB Living   4 4 4 0 0 4   SAB TAB Ectopic Molar Multiple Live Births   0 0 0 0 0 4      # Outcome Date GA Lbr Robin/2nd Weight Sex Delivery Anes PTL Lv   4 Term 21 38w5d 18:19 / 00:01 3100 g (6 lb 13.4 oz) F Vag-Spont None N DALI      Birth Comments: HC 33cm; AGA      Name: CORY WILDERREJI      Apgar1: 7  Apgar5: 9   3 Term 06/10/17 38w4d / 00:05 3180 g (7 lb 0.2 oz) M Vag-Spont None N DALI      Name: HAYLIE WILDERBOELISABETH      Apgar1: 7  Apgar5: 9   2 Term 10/07/15 39w0d  3090 g (6 lb 13 oz) M Vag-Spont Local N DALI      Name: Richard    1 Term 14 38w0d  2523 g (5 lb 9 oz) F Vag-Spont Local N DALI      Name: Omar       Review of Systems   All other systems reviewed and are negative.         Objective   Vitals:     08/09/21 1106   BP: 128/88       Physical Exam  Vitals and nursing note reviewed. Exam conducted with a chaperone present.   Constitutional:       Appearance: Normal appearance.   HENT:      Head: Normocephalic and atraumatic.   Abdominal:      General: Abdomen is flat. Bowel sounds are normal.      Palpations: Abdomen is soft.   Musculoskeletal:         General: Normal range of motion.      Cervical back: Normal range of motion and neck supple.   Skin:     General: Skin is warm and dry.   Neurological:      General: No focal deficit present.      Mental Status: She is alert and oriented to person, place, and time. Mental status is at baseline.   Psychiatric:         Mood and Affect: Mood normal.         Behavior: Behavior normal.         Thought Content: Thought content normal.         Judgment: Judgment normal.            Assessment/Plan   Assessment/Plan:  Diagnoses and all orders for this visit:    1. Postpartum examination following vaginal delivery (Primary)    2. Smoker    3. Encounter for initial prescription of contraceptive pills    4. Consultation for female sterilization  -     Case Request  -     sodium chloride 0.9 % infusion  -     ceFAZolin (ANCEF) 2 g in sodium chloride 0.9 % 100 mL IVPB    Other orders  -     norethindrone-ethinyl estradiol FE (Junel FE 1/20) 1-20 MG-MCG per tablet; Take 1 tablet by mouth Daily.  Dispense: 28 tablet; Refill: 3  -     Follow Anesthesia Guidelines / Standing Orders; Future  -     Obtain informed consent  -     Provide NPO Instructions to Patient; Future  -     Chlorhexidine Skin Prep; Future  -     Follow Anesthesia Guidelines / Standing Orders; Standing  -     Verify NPO Status; Standing  -     Obtain informed consent; Standing  -     SCD (sequential compression device)- to be placed on patient in Pre-op; Standing  -     Clip operative site; Standing  -     Have patient void prior to transfer; Standing  -     Verify / Perform Chlorhexidine Skin Prep; Standing  -      Verify / Perform Chlorhexidine Skin Prep if Indicated (If Not Already Completed); Standing  -     Instructions on coughing, deep breathing, and incentive spirometry.; Standing        The risks, benefits, and alternatives of the procedure; along with the risks of anesthesia was discussed in full with the patient and all questions were answered.    Plan SALPINGECTOMY LAPAROSCOPIC for sterilization    Yogesh Jean MD'

## 2021-08-09 NOTE — PROGRESS NOTES
Chief Complaint  Postpartum Care (pt here for 4wk postpartum appt, had vaginal delivery 07/11/2021, had baby girl, formula feeding, denies any problems)    Subjective          Maida Delacruz presents to [facility]  Patient presents today for postpartum exam  She is 4 weeks status post vaginal delivery at term  She is bottlefeeding    Birth control options were discussed  She would like to start birth control pills now  She also desires permanent sterilization    Risks, benefits, and alternatives of permanent sterilization were discussed with the patient in detail. Intraoperative risks of bleeding and damage to surrounding organs, including but not limited to intestine, bladder and ureter, were explained. Management of these were also explained. Postoperative complications such as infection, pneumonia, DVT, and bleeding were explained. The importance of compliance with postoperative restrictions was discussed. Laparoscopic versus hysteroscopic options were discussed. In regards to Essure, adequate contraception until hysterosalpingogram confirms tubal blockage was explained. Success and failure rates were discussed. Increased risk of ectopic pregnancy was explained. In addition, reversible forms of contraception were reviewed such as the pill, the patch, the shot, and the IUD.     Specifically, bilateral salpingectomy was discussed.  Reduction in fallopian tube cancer was discussed as well as potential decrease in ovarian cancer risk discussed.  Irreversible nature of this approach as well as the need for at least one additional laparoscopic incision was discussed.    Recent concerns regarding the Essure procedure were reviewed as well as the management of those issues, if they were to occur, were explained in detail.    All of the patient's questions were answered to her satisfaction. She was encouraged to return for an additional appointment if she had further questions. She verbalized  "understanding of the above and wished to proceed with the outlined plan.    Patient desires bilateral salpingectomy      Objective   Vital Signs:   /88 (BP Location: Left arm, Patient Position: Sitting, Cuff Size: Adult)   Ht 154.9 cm (61\")   Wt 86.2 kg (190 lb)   BMI 35.90 kg/m²     Physical Exam  Vitals and nursing note reviewed. Exam conducted with a chaperone present.   Constitutional:       Appearance: Normal appearance.   HENT:      Head: Normocephalic and atraumatic.   Abdominal:      General: Abdomen is flat. Bowel sounds are normal.      Palpations: Abdomen is soft.   Musculoskeletal:         General: Normal range of motion.      Cervical back: Normal range of motion and neck supple.   Skin:     General: Skin is warm and dry.   Neurological:      General: No focal deficit present.      Mental Status: She is alert and oriented to person, place, and time. Mental status is at baseline.   Psychiatric:         Mood and Affect: Mood normal.         Behavior: Behavior normal.         Thought Content: Thought content normal.         Judgment: Judgment normal.        Result Review :   The following data was reviewed by: Yogesh Jean MD on 08/09/2021:  Common labs    Common Labsle 4/30/21 7/10/21 7/12/21   WBC  10.41 12.10 (A)   Hemoglobin 12.0 11.6 (A) 11.7 (A)   Hematocrit  35.1 36.7   Platelets  225 215   (A) Abnormal value                 L&D Delivery Note by Brissa Fernandez DO (07/11/2021 03:33)           Assessment and Plan    Diagnoses and all orders for this visit:    1. Postpartum examination following vaginal delivery (Primary)    2. Smoker    3. Encounter for initial prescription of contraceptive pills    4. Consultation for female sterilization  -     Case Request  -     sodium chloride 0.9 % infusion  -     ceFAZolin (ANCEF) 2 g in sodium chloride 0.9 % 100 mL IVPB    Other orders  -     norethindrone-ethinyl estradiol FE (Junel FE 1/20) 1-20 MG-MCG per tablet; Take 1 tablet by mouth " Daily.  Dispense: 28 tablet; Refill: 3  -     Follow Anesthesia Guidelines / Standing Orders; Future  -     Obtain informed consent  -     Provide NPO Instructions to Patient; Future  -     Chlorhexidine Skin Prep; Future  -     Follow Anesthesia Guidelines / Standing Orders; Standing  -     Verify NPO Status; Standing  -     Obtain informed consent; Standing  -     SCD (sequential compression device)- to be placed on patient in Pre-op; Standing  -     Clip operative site; Standing  -     Have patient void prior to transfer; Standing  -     Verify / Perform Chlorhexidine Skin Prep; Standing  -     Verify / Perform Chlorhexidine Skin Prep if Indicated (If Not Already Completed); Standing  -     Instructions on coughing, deep breathing, and incentive spirometry.; Standing        Follow Up   Return in about 3 months (around 11/9/2021) for Annual physical.  Patient was given instructions and counseling regarding her condition or for health maintenance advice. Please see specific information pulled into the AVS if appropriate.     When initially starting birth control pills, the first pilll should be taken on the Sunday following the onset of your next cycle.  For maximum effectiveness, it should be taken the same time each day.  Because it may take 1 month to become effective, you should use of alternative contraception for the first month.  There is the potential for breakthrough bleeding to occur for up to 4 cycles.     Should you fail to take your birth control on time:    · If you miss a single pill, take it immediately that day.  · If you an entire day, take the pill you missed in addition to the pill you are scheduled to take.  · If you miss 2 consecutive pills, take the extra pill each of the next 2 days.  · If you ever miss 3 consecutive pills, discard the remainder of the pack and start a new pack of pills the Sunday after the next menses begins.  · Should you have any questions about how to manage this, call  the office during office hours and we will review this with you    Plan SALPINGECTOMY LAPAROSCOPIC for sterilization     Yogesh Jean MD

## 2021-08-20 ENCOUNTER — PRE-ADMISSION TESTING (OUTPATIENT)
Dept: PREADMISSION TESTING | Facility: HOSPITAL | Age: 26
End: 2021-08-20

## 2021-08-20 VITALS
HEIGHT: 61 IN | SYSTOLIC BLOOD PRESSURE: 120 MMHG | BODY MASS INDEX: 40.46 KG/M2 | WEIGHT: 214.29 LBS | OXYGEN SATURATION: 100 % | RESPIRATION RATE: 16 BRPM | HEART RATE: 69 BPM | DIASTOLIC BLOOD PRESSURE: 65 MMHG

## 2021-08-20 LAB
DEPRECATED RDW RBC AUTO: 47.4 FL (ref 37–54)
ERYTHROCYTE [DISTWIDTH] IN BLOOD BY AUTOMATED COUNT: 14.7 % (ref 12.3–15.4)
HCT VFR BLD AUTO: 38.4 % (ref 34–46.6)
HGB BLD-MCNC: 12 G/DL (ref 12–15.9)
MCH RBC QN AUTO: 27.5 PG (ref 26.6–33)
MCHC RBC AUTO-ENTMCNC: 31.3 G/DL (ref 31.5–35.7)
MCV RBC AUTO: 87.9 FL (ref 79–97)
PLATELET # BLD AUTO: 296 10*3/MM3 (ref 140–450)
PMV BLD AUTO: 11 FL (ref 6–12)
RBC # BLD AUTO: 4.37 10*6/MM3 (ref 3.77–5.28)
WBC # BLD AUTO: 8.06 10*3/MM3 (ref 3.4–10.8)

## 2021-08-20 PROCEDURE — 85027 COMPLETE CBC AUTOMATED: CPT

## 2021-08-20 PROCEDURE — 36415 COLL VENOUS BLD VENIPUNCTURE: CPT

## 2021-08-20 NOTE — DISCHARGE INSTRUCTIONS
DAY OF SURGERY INSTRUCTIONS        YOUR SURGEON: ***    PROCEDURE: ***    DATE OF SURGERY: ***    ARRIVAL TIME: AS DIRECTED BY OFFICE    YOU MAY TAKE THE FOLLOWING MEDICATION(S) THE MORNING OF SURGERY WITH A SIP OF WATER: ***      ALL OTHER HOME MEDICATION CHECK WITH YOUR PHYSICIAN      DO NOT TAKE ANY ERECTILE DYSFUNCTION MEDICATIONS (EX: CIALIS, VIAGRA) 24 HOURS PRIOR TO SURGERY                      MANAGING PAIN AFTER SURGERY    We know you are probably wondering what your pain will be like after surgery.  Following surgery it is unrealistic to expect you will not have pain.   Pain is how our bodies let us know that something is wrong or cautions us to be careful.  That said, our goal is to make your pain tolerable.    Methods we may use to treat your pain include (oral or IV medications, PCAs, epidurals, nerve blocks, etc.)   While some procedures require IV pain medications for a short time after surgery, transitioning to pain medications by mouth allows for better management of pain.   Your nurse will encourage you to take oral pain medications whenever possible.  IV medications work almost immediately, but only last a short while.  Taking medications by mouth allows for a more constant level of medication in your blood stream for a longer period of time.      Once your pain is out of control it is harder to get back under control.  It is important you are aware when your next dose of pain medication is due.  If you are admitted, your nurse may write the time of your next dose on the white board in your room to help you remember.      We are interested in your pain and encourage you to inform us about aggravating factors during your visit.   Many times a simple repositioning every few hours can make a big difference.    If your physician says it is okay, do not let your pain prevent you from getting out of bed. Be sure to call your nurse for assistance prior to getting up so you do not fall.      Before  surgery, please decide your tolerable pain goal.  These faces help describe the pain ratings we use on a 0-10 scale.   Be prepared to tell us your goal and whether or not you take pain or anxiety medications at home.          BEFORE YOU COME TO THE HOSPITAL  (Pre-op instructions)  • Do not eat, drink, smoke or chew gum after midnight the night before surgery.  This also includes no mints.  • Morning of surgery take only the medicines you have been instructed with a sip of water unless otherwise instructed  by your physician.  • Do not shave, wear makeup or dark nail polish.  • Remove all jewelry including rings.  • Leave anything you consider valuable at home.  • Leave your suitcase in the car until after your surgery.  • Bring the following with you if applicable:  o Picture ID and insurance, Medicare or Medicaid cards  o Co-pay/deductible required by insurance (cash, check, credit card)  o Copy of advance directive, living will or power-of- documents if not brought to PAT  o CPAP or BIPAP mask and tubing  o Relaxation aids ( book, magazine), etc.  o Hearing aids                        ON THE DAY OF SURGERY  · On the day of surgery check in at registration located at the main entrance of the hospital.   ? You will be registered and given a beeper with instructions where to wait in the main lobby.  ? When your beeper lights up and vibrates a member of the Outpatient Surgery staff will meet you at the double doors under the stair steps and escort you to your preoperative room.   · You may have cloth compression devices placed on your legs. These help to prevent blood clots and reduce swelling in your legs.  · An IV may be inserted into one of your veins.  · In the operating room, you may be given one or more of the following:  ? A medicine to help you relax (sedative).  ? A medicine to numb the area (local anesthetic).  ? A medicine to make you fall asleep (general anesthetic).  ? A medicine that is injected  "into an area of your body to numb everything below the injection site (regional anesthetic).  · Your surgical site will be marked or identified.  · You may be given an antibiotic through your IV to help prevent infection.  Contact a health care provider if you:  · Develop a fever of more than 100.4°F (38°C) or other feelings of illness during the 48 hours before your surgery.  · Have symptoms that get worse.  Have questions or concerns about your surgery    General Anesthesia/Surgery, Adult  General anesthesia is the use of medicines to make a person \"go to sleep\" (unconscious) for a medical procedure. General anesthesia must be used for certain procedures, and is often recommended for procedures that:  · Last a long time.  · Require you to be still or in an unusual position.  · Are major and can cause blood loss.  The medicines used for general anesthesia are called general anesthetics. As well as making you unconscious for a certain amount of time, these medicines:  · Prevent pain.  · Control your blood pressure.  · Relax your muscles.  Tell a health care provider about:  · Any allergies you have.  · All medicines you are taking, including vitamins, herbs, eye drops, creams, and over-the-counter medicines.  · Any problems you or family members have had with anesthetic medicines.  · Types of anesthetics you have had in the past.  · Any blood disorders you have.  · Any surgeries you have had.  · Any medical conditions you have.  · Any recent upper respiratory, chest, or ear infections.  · Any history of:  ? Heart or lung conditions, such as heart failure, sleep apnea, asthma, or chronic obstructive pulmonary disease (COPD).  ?  service.  ? Depression or anxiety.  · Any tobacco or drug use, including marijuana or alcohol use.  · Whether you are pregnant or may be pregnant.  What are the risks?  Generally, this is a safe procedure. However, problems may occur, including:  · Allergic reaction.  · Lung and heart " problems.  · Inhaling food or liquid from the stomach into the lungs (aspiration).  · Nerve injury.  · Air in the bloodstream, which can lead to stroke.  · Extreme agitation or confusion (delirium) when you wake up from the anesthetic.  · Waking up during your procedure and being unable to move. This is rare.  These problems are more likely to develop if you are having a major surgery or if you have an advanced or serious medical condition. You can prevent some of these complications by answering all of your health care provider's questions thoroughly and by following all instructions before your procedure.  General anesthesia can cause side effects, including:  · Nausea or vomiting.  · A sore throat from the breathing tube.  · Hoarseness.  · Wheezing or coughing.  · Shaking chills.  · Tiredness.  · Body aches.  · Anxiety.  · Sleepiness or drowsiness.  · Confusion or agitation.  RISKS AND COMPLICATIONS OF SURGERY  Your health care provider will discuss possible risks and complications with you before surgery. Common risks and complications include:    · Problems due to the use of anesthetics.  · Blood loss and replacement (does not apply to minor surgical procedures).  · Temporary increase in pain due to surgery.  · Uncorrected pain or problems that the surgery was meant to correct.  · Infection.  · New damage.    What happens before the procedure?    Medicines  Ask your health care provider about:  · Changing or stopping your regular medicines. This is especially important if you are taking diabetes medicines or blood thinners.  · Taking medicines such as aspirin and ibuprofen. These medicines can thin your blood. Do not take these medicines unless your health care provider tells you to take them.  · Taking over-the-counter medicines, vitamins, herbs, and supplements. Do not take these during the week before your procedure unless your health care provider approves them.  General instructions  · Starting 3-6 weeks  before the procedure, do not use any products that contain nicotine or tobacco, such as cigarettes and e-cigarettes. If you need help quitting, ask your health care provider.  · If you brush your teeth on the morning of the procedure, make sure to spit out all of the toothpaste.  · Tell your health care provider if you become ill or develop a cold, cough, or fever.  · If instructed by your health care provider, bring your sleep apnea device with you on the day of your surgery (if applicable).  · Ask your health care provider if you will be going home the same day, the following day, or after a longer hospital stay.  ? Plan to have someone take you home from the hospital or clinic.  ? Plan to have a responsible adult care for you for at least 24 hours after you leave the hospital or clinic. This is important.  What happens during the procedure?  · You will be given anesthetics through both of the following:  ? A mask placed over your nose and mouth.  ? An IV in one of your veins.  · You may receive a medicine to help you relax (sedative).  · After you are unconscious, a breathing tube may be inserted down your throat to help you breathe. This will be removed before you wake up.  · An anesthesia specialist will stay with you throughout your procedure. He or she will:  ? Keep you comfortable and safe by continuing to give you medicines and adjusting the amount of medicine that you get.  ? Monitor your blood pressure, pulse, and oxygen levels to make sure that the anesthetics do not cause any problems.  The procedure may vary among health care providers and hospitals.  What happens after the procedure?  · Your blood pressure, temperature, heart rate, breathing rate, and blood oxygen level will be monitored until the medicines you were given have worn off.  · You will wake up in a recovery area. You may wake up slowly.  · If you feel anxious or agitated, you may be given medicine to help you calm down.  · If you will be  going home the same day, your health care provider may check to make sure you can walk, drink, and urinate.  · Your health care provider will treat any pain or side effects you have before you go home.  · Do not drive for 24 hours if you were given a sedative.  Summary  · General anesthesia is used to keep you still and prevent pain during a procedure.  · It is important to tell your healthcare provider about your medical history and any surgeries you have had, and previous experience with anesthesia.  · Follow your healthcare provider’s instructions about when to stop eating, drinking, or taking certain medicines before your procedure.  · Plan to have someone take you home from the hospital or clinic.  This information is not intended to replace advice given to you by your health care provider. Make sure you discuss any questions you have with your health care provider.  Document Released: 03/26/2009 Document Revised: 08/03/2018 Document Reviewed: 08/03/2018  [employer] Interactive Patient Education © 2019 [employer]       Fall Prevention in Hospitals, Adult  As a hospital patient, your condition and the treatments you receive can increase your risk for falls. Some additional risk factors for falls in a hospital include:  · Being in an unfamiliar environment.  · Being on bed rest.  · Your surgery.  · Taking certain medicines.  · Your tubing requirements, such as intravenous (IV) therapy or catheters.  It is important that you learn how to decrease fall risks while at the hospital. Below are important tips that can help prevent falls.  SAFETY TIPS FOR PREVENTING FALLS  Talk about your risk of falling.  · Ask your health care provider why you are at risk for falling. Is it your medicine, illness, tubing placement, or something else?  · Make a plan with your health care provider to keep you safe from falls.  · Ask your health care provider or pharmacist about side effects of your medicines. Some medicines can make  you dizzy or affect your coordination.  Ask for help.  · Ask for help before getting out of bed. You may need to press your call button.  · Ask for assistance in getting safely to the toilet.  · Ask for a walker or cane to be put at your bedside. Ask that most of the side rails on your bed be placed up before your health care provider leaves the room.  · Ask family or friends to sit with you.  · Ask for things that are out of your reach, such as your glasses, hearing aids, telephone, bedside table, or call button.  Follow these tips to avoid falling:  · Stay lying or seated, rather than standing, while waiting for help.  · Wear rubber-soled slippers or shoes whenever you walk in the hospital.  · Avoid quick, sudden movements.  ¨ Change positions slowly.  ¨ Sit on the side of your bed before standing.  ¨ Stand up slowly and wait before you start to walk.  · Let your health care provider know if there is a spill on the floor.  · Pay careful attention to the medical equipment, electrical cords, and tubes around you.  · When you need help, use your call button by your bed or in the bathroom. Wait for one of your health care providers to help you.  · If you feel dizzy or unsure of your footing, return to bed and wait for assistance.  · Avoid being distracted by the TV, telephone, or another person in your room.  · Do not lean or support yourself on rolling objects, such as IV poles or bedside tables.     This information is not intended to replace advice given to you by your health care provider. Make sure you discuss any questions you have with your health care provider.     Document Released: 12/15/2001 Document Revised: 01/08/2016 Document Reviewed: 08/25/2013  [employer] Interactive Patient Education ©2016 [employer]       [facility]  CHG 4% Patient Instruction Sheet    Chlorhexidine Before Surgery  Chlorhexidine gluconate (CHG) is a germ-killing (antiseptic) solution that is used to clean the skin. It  gets rid of the bacteria that normally live on the skin. Cleaning your skin with CHG before surgery helps lower the risk for infection after surgery.    How to use CHG solution  · You will take 2 showers, one shower the night before surgery, the second shower the morning of surgery before coming to the hospital.  · Use CHG only as told by your health care provider, and follow the instructions on the label.  · Use CHG solution while taking a shower. Follow these steps when using CHG solution (unless your health care provider gives you different instructions):  1. Start the shower.  2. Use your normal soap and shampoo to wash your face and hair.  3. Turn off the shower or move out of the shower stream.  4. Pour the CHG onto a clean washcloth. Do not use any type of brush or rough-edged sponge.  5. Starting at your neck, lather your body down to your toes. Make sure you:  6. Pay special attention to the part of your body where you will be having surgery. Scrub this area for at least 1 minute.  7. Use the full amount of CHG as directed. Usually, this is one half bottle for each shower.  8. Do not use CHG on your head or face. If the solution gets into your ears or eyes, rinse them well with water.  9. Avoid your genital area.  10. Avoid any areas of skin that have broken skin, cuts, or scrapes.  11. Scrub your back and under your arms. Make sure to wash skin folds.  12. Let the lather sit on your skin for 1-2 minutes or as long as told by your health care  provider.  13. Thoroughly rinse your entire body in the shower. Make sure that all body creases and crevices are rinsed well.  14. Dry off with a clean towel. Do not put any substances on your body afterward, such as powder, lotion, or perfume.  15. Put on clean clothes or pajamas.  16. If it is the night before your surgery, sleep in clean sheets.    What are the risks?  Risks of using CHG include:  · A skin reaction.  · Hearing loss, if CHG gets in your ears.  · Eye  injury, if CHG gets in your eyes and is not rinsed out.  · The CHG product catching fire.  Make sure that you avoid smoking and flames after applying CHG to your skin.  Do not use CHG:  · If you have a chlorhexidine allergy or have previously reacted to chlorhexidine.  · On babies younger than 2 months of age.      On the day of surgery, when you are taken to your room in Outpatient Surgery you will be given a CHG prepackaged cloth to wipe the site for your surgery.  How to use CHG prepackaged cloths  · Follow the instructions on the label.  · Use the CHG cloth on clean, dry skin. Follow these steps when using a CHG cloth (unless your health care provider gives you different instructions):  1. Using the CHG cloth, vigorously scrub the part of your body where you will be having surgery. Scrub using a back-and-forth motion for 3 minutes. The area on your body should be completely wet with CHG when you are finished scrubbing.  2. Do not rinse. Discard the cloth and let the area air-dry for 1 minute. Do not put any substances on your body afterward, such as powder, lotion, or perfume.  Contact a health care provider if:  · Your skin gets irritated after scrubbing.  · You have questions about using your solution or cloth.  Get help right away if:  · Your eyes become very red or swollen.  · Your eyes itch badly.  · Your skin itches badly and is red or swollen.  · Your hearing changes.  · You have trouble seeing.  · You have swelling or tingling in your mouth or throat.  · You have trouble breathing.  · You swallow any chlorhexidine.  Summary  · Chlorhexidine gluconate (CHG) is a germ-killing (antiseptic) solution that is used to clean the skin. Cleaning your skin with CHG before surgery helps lower the risk for infection after surgery.  · You may be given CHG to use at home. It may be in a bottle or in a prepackaged cloth to use on your skin. Carefully follow your health care provider's instructions and the instructions  on the product label.  · Do not use CHG if you have a chlorhexidine allergy.  · Contact your health care provider if your skin gets irritated after scrubbing.  This information is not intended to replace advice given to you by your health care provider. Make sure you discuss any questions you have with your health care provider.  Document Released: 09/11/2013 Document Revised: 11/15/2018 Document Reviewed: 11/15/2018  [employer] Interactive Patient Education © 2019 [employer]          PATIENT/FAMILY/RESPONSIBLE PARTY VERBALIZES UNDERSTANDING OF ABOVE EDUCATION.  COPY OF PAIN SCALE GIVEN AND REVIEWED WITH VERBALIZED UNDERSTANDING.

## 2021-08-23 ENCOUNTER — TELEPHONE (OUTPATIENT)
Dept: OBSTETRICS AND GYNECOLOGY | Facility: CLINIC | Age: 26
End: 2021-08-23

## 2021-08-23 ENCOUNTER — TRANSCRIBE ORDERS (OUTPATIENT)
Dept: LAB | Facility: HOSPITAL | Age: 26
End: 2021-08-23

## 2021-08-23 DIAGNOSIS — Z01.818 PREOP TESTING: Primary | ICD-10-CM

## 2021-08-23 NOTE — TELEPHONE ENCOUNTER
Called and informed pt that her sx time has changed on 08/27/2021 and that she needed to arrive at [facility] at 9:00AM and reminded pt not to eat or drink after midnight, pt voiced understanding.

## 2021-08-24 ENCOUNTER — LAB (OUTPATIENT)
Dept: LAB | Facility: HOSPITAL | Age: 26
End: 2021-08-24

## 2021-08-24 LAB — SARS-COV-2 ORF1AB RESP QL NAA+PROBE: NOT DETECTED

## 2021-08-24 PROCEDURE — U0004 COV-19 TEST NON-CDC HGH THRU: HCPCS | Performed by: OBSTETRICS & GYNECOLOGY

## 2021-08-24 PROCEDURE — C9803 HOPD COVID-19 SPEC COLLECT: HCPCS | Performed by: OBSTETRICS & GYNECOLOGY

## 2021-08-27 ENCOUNTER — ANESTHESIA (OUTPATIENT)
Dept: PERIOP | Facility: HOSPITAL | Age: 26
End: 2021-08-27

## 2021-08-27 ENCOUNTER — ANESTHESIA EVENT (OUTPATIENT)
Dept: PERIOP | Facility: HOSPITAL | Age: 26
End: 2021-08-27

## 2021-08-27 ENCOUNTER — HOSPITAL ENCOUNTER (OUTPATIENT)
Facility: HOSPITAL | Age: 26
Setting detail: HOSPITAL OUTPATIENT SURGERY
Discharge: HOME | End: 2021-08-27
Attending: OBSTETRICS & GYNECOLOGY | Admitting: OBSTETRICS & GYNECOLOGY

## 2021-08-27 VITALS
DIASTOLIC BLOOD PRESSURE: 62 MMHG | HEART RATE: 116 BPM | OXYGEN SATURATION: 95 % | TEMPERATURE: 97.8 F | RESPIRATION RATE: 18 BRPM | SYSTOLIC BLOOD PRESSURE: 112 MMHG

## 2021-08-27 DIAGNOSIS — Z30.09 CONSULTATION FOR FEMALE STERILIZATION: ICD-10-CM

## 2021-08-27 LAB — B-HCG UR QL: NEGATIVE

## 2021-08-27 PROCEDURE — 25010000002 ONDANSETRON PER 1 MG: Performed by: ANESTHESIOLOGY

## 2021-08-27 PROCEDURE — 25010000002 CEFAZOLIN PER 500 MG: Performed by: OBSTETRICS & GYNECOLOGY

## 2021-08-27 PROCEDURE — 88302 TISSUE EXAM BY PATHOLOGIST: CPT | Performed by: OBSTETRICS & GYNECOLOGY

## 2021-08-27 PROCEDURE — 25010000002 ONDANSETRON PER 1 MG: Performed by: NURSE ANESTHETIST, CERTIFIED REGISTERED

## 2021-08-27 PROCEDURE — 81025 URINE PREGNANCY TEST: CPT | Performed by: OBSTETRICS & GYNECOLOGY

## 2021-08-27 PROCEDURE — 25010000002 DEXAMETHASONE PER 1 MG: Performed by: NURSE ANESTHETIST, CERTIFIED REGISTERED

## 2021-08-27 PROCEDURE — 25010000002 HYDROMORPHONE PER 4 MG: Performed by: ANESTHESIOLOGY

## 2021-08-27 PROCEDURE — 25010000002 DEXAMETHASONE PER 1 MG: Performed by: ANESTHESIOLOGY

## 2021-08-27 PROCEDURE — 58661 LAPAROSCOPY REMOVE ADNEXA: CPT | Performed by: OBSTETRICS & GYNECOLOGY

## 2021-08-27 PROCEDURE — 63710000001 PROMETHAZINE PER 25 MG: Performed by: OBSTETRICS & GYNECOLOGY

## 2021-08-27 PROCEDURE — 25010000002 PROPOFOL 10 MG/ML EMULSION: Performed by: NURSE ANESTHETIST, CERTIFIED REGISTERED

## 2021-08-27 PROCEDURE — 25010000002 DROPERIDOL PER 5 MG: Performed by: ANESTHESIOLOGY

## 2021-08-27 PROCEDURE — 25010000002 FENTANYL CITRATE (PF) 250 MCG/5ML SOLUTION: Performed by: NURSE ANESTHETIST, CERTIFIED REGISTERED

## 2021-08-27 PROCEDURE — 25010000002 FENTANYL CITRATE (PF) 100 MCG/2ML SOLUTION: Performed by: NURSE ANESTHETIST, CERTIFIED REGISTERED

## 2021-08-27 PROCEDURE — 25010000002 MIDAZOLAM PER 1 MG: Performed by: ANESTHESIOLOGY

## 2021-08-27 RX ORDER — FENTANYL CITRATE 50 UG/ML
INJECTION, SOLUTION INTRAMUSCULAR; INTRAVENOUS AS NEEDED
Status: DISCONTINUED | OUTPATIENT
Start: 2021-08-27 | End: 2021-08-27 | Stop reason: SURG

## 2021-08-27 RX ORDER — OXYCODONE HYDROCHLORIDE AND ACETAMINOPHEN 5; 325 MG/1; MG/1
1 TABLET ORAL ONCE AS NEEDED
Status: DISCONTINUED | OUTPATIENT
Start: 2021-08-27 | End: 2021-08-27 | Stop reason: HOSPADM

## 2021-08-27 RX ORDER — PHENYLEPHRINE HCL IN 0.9% NACL 1 MG/10 ML
SYRINGE (ML) INTRAVENOUS AS NEEDED
Status: DISCONTINUED | OUTPATIENT
Start: 2021-08-27 | End: 2021-08-27 | Stop reason: SURG

## 2021-08-27 RX ORDER — NEOSTIGMINE METHYLSULFATE 5 MG/5 ML
SYRINGE (ML) INTRAVENOUS AS NEEDED
Status: DISCONTINUED | OUTPATIENT
Start: 2021-08-27 | End: 2021-08-27 | Stop reason: SURG

## 2021-08-27 RX ORDER — HYDROMORPHONE HYDROCHLORIDE 1 MG/ML
0.5 INJECTION, SOLUTION INTRAMUSCULAR; INTRAVENOUS; SUBCUTANEOUS
Status: DISCONTINUED | OUTPATIENT
Start: 2021-08-27 | End: 2021-08-27 | Stop reason: HOSPADM

## 2021-08-27 RX ORDER — SODIUM CHLORIDE, SODIUM LACTATE, POTASSIUM CHLORIDE, CALCIUM CHLORIDE 600; 310; 30; 20 MG/100ML; MG/100ML; MG/100ML; MG/100ML
1000 INJECTION, SOLUTION INTRAVENOUS CONTINUOUS
Status: DISCONTINUED | OUTPATIENT
Start: 2021-08-27 | End: 2021-08-27 | Stop reason: HOSPADM

## 2021-08-27 RX ORDER — DROPERIDOL 2.5 MG/ML
0.62 INJECTION, SOLUTION INTRAMUSCULAR; INTRAVENOUS ONCE
Status: COMPLETED | OUTPATIENT
Start: 2021-08-27 | End: 2021-08-27

## 2021-08-27 RX ORDER — FENTANYL CITRATE 50 UG/ML
25 INJECTION, SOLUTION INTRAMUSCULAR; INTRAVENOUS
Status: DISCONTINUED | OUTPATIENT
Start: 2021-08-27 | End: 2021-08-27 | Stop reason: HOSPADM

## 2021-08-27 RX ORDER — SODIUM CHLORIDE 0.9 % (FLUSH) 0.9 %
3 SYRINGE (ML) INJECTION AS NEEDED
Status: DISCONTINUED | OUTPATIENT
Start: 2021-08-27 | End: 2021-08-27 | Stop reason: HOSPADM

## 2021-08-27 RX ORDER — SODIUM CHLORIDE 0.9 % (FLUSH) 0.9 %
10 SYRINGE (ML) INJECTION EVERY 12 HOURS SCHEDULED
Status: DISCONTINUED | OUTPATIENT
Start: 2021-08-27 | End: 2021-08-27 | Stop reason: HOSPADM

## 2021-08-27 RX ORDER — DEXTROSE MONOHYDRATE 25 G/50ML
12.5 INJECTION, SOLUTION INTRAVENOUS AS NEEDED
Status: DISCONTINUED | OUTPATIENT
Start: 2021-08-27 | End: 2021-08-27 | Stop reason: HOSPADM

## 2021-08-27 RX ORDER — MIDAZOLAM HYDROCHLORIDE 1 MG/ML
1 INJECTION INTRAMUSCULAR; INTRAVENOUS
Status: DISCONTINUED | OUTPATIENT
Start: 2021-08-27 | End: 2021-08-27 | Stop reason: HOSPADM

## 2021-08-27 RX ORDER — NALOXONE HCL 0.4 MG/ML
0.04 VIAL (ML) INJECTION AS NEEDED
Status: DISCONTINUED | OUTPATIENT
Start: 2021-08-27 | End: 2021-08-27 | Stop reason: HOSPADM

## 2021-08-27 RX ORDER — OXYCODONE HYDROCHLORIDE AND ACETAMINOPHEN 5; 325 MG/1; MG/1
1-2 TABLET ORAL EVERY 4 HOURS PRN
Qty: 5 TABLET | Refills: 0 | Status: SHIPPED | OUTPATIENT
Start: 2021-08-27

## 2021-08-27 RX ORDER — SODIUM CHLORIDE 9 MG/ML
100 INJECTION, SOLUTION INTRAVENOUS CONTINUOUS
Status: DISCONTINUED | OUTPATIENT
Start: 2021-08-27 | End: 2021-08-27 | Stop reason: HOSPADM

## 2021-08-27 RX ORDER — DEXAMETHASONE SODIUM PHOSPHATE 4 MG/ML
INJECTION, SOLUTION INTRA-ARTICULAR; INTRALESIONAL; INTRAMUSCULAR; INTRAVENOUS; SOFT TISSUE AS NEEDED
Status: DISCONTINUED | OUTPATIENT
Start: 2021-08-27 | End: 2021-08-27 | Stop reason: SURG

## 2021-08-27 RX ORDER — ONDANSETRON 2 MG/ML
4 INJECTION INTRAMUSCULAR; INTRAVENOUS AS NEEDED
Status: COMPLETED | OUTPATIENT
Start: 2021-08-27 | End: 2021-08-27

## 2021-08-27 RX ORDER — FAMOTIDINE 10 MG/ML
20 INJECTION, SOLUTION INTRAVENOUS
Status: DISCONTINUED | OUTPATIENT
Start: 2021-08-27 | End: 2021-08-27 | Stop reason: HOSPADM

## 2021-08-27 RX ORDER — PROPOFOL 10 MG/ML
VIAL (ML) INTRAVENOUS AS NEEDED
Status: DISCONTINUED | OUTPATIENT
Start: 2021-08-27 | End: 2021-08-27 | Stop reason: SURG

## 2021-08-27 RX ORDER — IBUPROFEN 600 MG/1
600 TABLET ORAL ONCE AS NEEDED
Status: DISCONTINUED | OUTPATIENT
Start: 2021-08-27 | End: 2021-08-27 | Stop reason: HOSPADM

## 2021-08-27 RX ORDER — SODIUM CHLORIDE, SODIUM LACTATE, POTASSIUM CHLORIDE, CALCIUM CHLORIDE 600; 310; 30; 20 MG/100ML; MG/100ML; MG/100ML; MG/100ML
9 INJECTION, SOLUTION INTRAVENOUS CONTINUOUS
Status: DISCONTINUED | OUTPATIENT
Start: 2021-08-27 | End: 2021-08-27 | Stop reason: HOSPADM

## 2021-08-27 RX ORDER — OXYCODONE AND ACETAMINOPHEN 10; 325 MG/1; MG/1
1 TABLET ORAL ONCE AS NEEDED
Status: DISCONTINUED | OUTPATIENT
Start: 2021-08-27 | End: 2021-08-27 | Stop reason: HOSPADM

## 2021-08-27 RX ORDER — SCOLOPAMINE TRANSDERMAL SYSTEM 1 MG/1
1 PATCH, EXTENDED RELEASE TRANSDERMAL CONTINUOUS
Status: DISCONTINUED | OUTPATIENT
Start: 2021-08-27 | End: 2021-08-27 | Stop reason: HOSPADM

## 2021-08-27 RX ORDER — LABETALOL HYDROCHLORIDE 5 MG/ML
5 INJECTION, SOLUTION INTRAVENOUS
Status: DISCONTINUED | OUTPATIENT
Start: 2021-08-27 | End: 2021-08-27 | Stop reason: HOSPADM

## 2021-08-27 RX ORDER — ONDANSETRON 2 MG/ML
INJECTION INTRAMUSCULAR; INTRAVENOUS AS NEEDED
Status: DISCONTINUED | OUTPATIENT
Start: 2021-08-27 | End: 2021-08-27 | Stop reason: SURG

## 2021-08-27 RX ORDER — FLUMAZENIL 0.1 MG/ML
0.2 INJECTION INTRAVENOUS AS NEEDED
Status: DISCONTINUED | OUTPATIENT
Start: 2021-08-27 | End: 2021-08-27 | Stop reason: HOSPADM

## 2021-08-27 RX ORDER — ROCURONIUM BROMIDE 10 MG/ML
INJECTION, SOLUTION INTRAVENOUS AS NEEDED
Status: DISCONTINUED | OUTPATIENT
Start: 2021-08-27 | End: 2021-08-27 | Stop reason: SURG

## 2021-08-27 RX ORDER — MAGNESIUM HYDROXIDE 1200 MG/15ML
LIQUID ORAL AS NEEDED
Status: DISCONTINUED | OUTPATIENT
Start: 2021-08-27 | End: 2021-08-27 | Stop reason: HOSPADM

## 2021-08-27 RX ORDER — IBUPROFEN 600 MG/1
600 TABLET ORAL EVERY 6 HOURS PRN
Status: DISCONTINUED | OUTPATIENT
Start: 2021-08-27 | End: 2021-08-27 | Stop reason: HOSPADM

## 2021-08-27 RX ORDER — DEXAMETHASONE SODIUM PHOSPHATE 4 MG/ML
4 INJECTION, SOLUTION INTRA-ARTICULAR; INTRALESIONAL; INTRAMUSCULAR; INTRAVENOUS; SOFT TISSUE ONCE AS NEEDED
Status: COMPLETED | OUTPATIENT
Start: 2021-08-27 | End: 2021-08-27

## 2021-08-27 RX ORDER — PROMETHAZINE HYDROCHLORIDE 25 MG/1
12.5 TABLET ORAL ONCE AS NEEDED
Status: COMPLETED | OUTPATIENT
Start: 2021-08-27 | End: 2021-08-27

## 2021-08-27 RX ORDER — LIDOCAINE HYDROCHLORIDE 10 MG/ML
0.5 INJECTION, SOLUTION EPIDURAL; INFILTRATION; INTRACAUDAL; PERINEURAL ONCE AS NEEDED
Status: DISCONTINUED | OUTPATIENT
Start: 2021-08-27 | End: 2021-08-27 | Stop reason: HOSPADM

## 2021-08-27 RX ORDER — BUPIVACAINE HCL/0.9 % NACL/PF 0.1 %
2 PLASTIC BAG, INJECTION (ML) EPIDURAL ONCE
Status: COMPLETED | OUTPATIENT
Start: 2021-08-27 | End: 2021-08-27

## 2021-08-27 RX ORDER — SODIUM CHLORIDE 0.9 % (FLUSH) 0.9 %
10 SYRINGE (ML) INJECTION AS NEEDED
Status: DISCONTINUED | OUTPATIENT
Start: 2021-08-27 | End: 2021-08-27 | Stop reason: HOSPADM

## 2021-08-27 RX ADMIN — FENTANYL CITRATE 100 MCG: 50 INJECTION, SOLUTION INTRAMUSCULAR; INTRAVENOUS at 13:00

## 2021-08-27 RX ADMIN — ONDANSETRON 4 MG: 2 INJECTION INTRAMUSCULAR; INTRAVENOUS at 13:35

## 2021-08-27 RX ADMIN — GLYCOPYRROLATE 0.4 MG: 0.2 INJECTION, SOLUTION INTRAMUSCULAR; INTRAVENOUS at 13:07

## 2021-08-27 RX ADMIN — SCOPALAMINE 1 PATCH: 1 PATCH, EXTENDED RELEASE TRANSDERMAL at 11:58

## 2021-08-27 RX ADMIN — ROCURONIUM BROMIDE 35 MG: 50 INJECTION INTRAVENOUS at 12:29

## 2021-08-27 RX ADMIN — SODIUM CHLORIDE, POTASSIUM CHLORIDE, SODIUM LACTATE AND CALCIUM CHLORIDE 1000 ML: 600; 310; 30; 20 INJECTION, SOLUTION INTRAVENOUS at 09:09

## 2021-08-27 RX ADMIN — ONDANSETRON 4 MG: 2 INJECTION INTRAMUSCULAR; INTRAVENOUS at 13:01

## 2021-08-27 RX ADMIN — FENTANYL CITRATE 150 MCG: 50 INJECTION, SOLUTION INTRAMUSCULAR; INTRAVENOUS at 12:27

## 2021-08-27 RX ADMIN — Medication 3 MG: at 13:07

## 2021-08-27 RX ADMIN — ONDANSETRON 4 MG: 2 INJECTION INTRAMUSCULAR; INTRAVENOUS at 13:50

## 2021-08-27 RX ADMIN — Medication 100 MCG: at 12:47

## 2021-08-27 RX ADMIN — PROMETHAZINE HYDROCHLORIDE 12.5 MG: 25 TABLET ORAL at 14:54

## 2021-08-27 RX ADMIN — Medication 100 MCG: at 12:39

## 2021-08-27 RX ADMIN — Medication 2 G: at 12:01

## 2021-08-27 RX ADMIN — SODIUM CHLORIDE, POTASSIUM CHLORIDE, SODIUM LACTATE AND CALCIUM CHLORIDE 9 ML/HR: 600; 310; 30; 20 INJECTION, SOLUTION INTRAVENOUS at 14:06

## 2021-08-27 RX ADMIN — FENTANYL CITRATE 100 MCG: 50 INJECTION, SOLUTION INTRAMUSCULAR; INTRAVENOUS at 12:43

## 2021-08-27 RX ADMIN — MIDAZOLAM HYDROCHLORIDE 1 MG: 1 INJECTION, SOLUTION INTRAMUSCULAR; INTRAVENOUS at 11:58

## 2021-08-27 RX ADMIN — HYDROMORPHONE HYDROCHLORIDE 0.5 MG: 1 INJECTION, SOLUTION INTRAMUSCULAR; INTRAVENOUS; SUBCUTANEOUS at 13:42

## 2021-08-27 RX ADMIN — DROPERIDOL 0.62 MG: 2.5 INJECTION, SOLUTION INTRAMUSCULAR; INTRAVENOUS at 15:41

## 2021-08-27 RX ADMIN — DEXAMETHASONE SODIUM PHOSPHATE 4 MG: 4 INJECTION, SOLUTION INTRA-ARTICULAR; INTRALESIONAL; INTRAMUSCULAR; INTRAVENOUS; SOFT TISSUE at 11:58

## 2021-08-27 RX ADMIN — DEXAMETHASONE SODIUM PHOSPHATE 4 MG: 4 INJECTION, SOLUTION INTRA-ARTICULAR; INTRALESIONAL; INTRAMUSCULAR; INTRAVENOUS; SOFT TISSUE at 13:01

## 2021-08-27 RX ADMIN — SODIUM CHLORIDE, POTASSIUM CHLORIDE, SODIUM LACTATE AND CALCIUM CHLORIDE 1000 ML: 600; 310; 30; 20 INJECTION, SOLUTION INTRAVENOUS at 14:56

## 2021-08-27 RX ADMIN — FAMOTIDINE 20 MG: 10 INJECTION INTRAVENOUS at 11:58

## 2021-08-27 RX ADMIN — PROPOFOL 150 MG: 10 INJECTION, EMULSION INTRAVENOUS at 12:29

## 2021-08-27 NOTE — ANESTHESIA PROCEDURE NOTES
Airway  Urgency: elective    Date/Time: 8/27/2021 12:34 PM  Airway not difficult    General Information and Staff    Patient location during procedure: OR  CRNA: Sekou Richter CRNA    Indications and Patient Condition  Indications for airway management: airway protection    Preoxygenated: yes  MILS maintained throughout  Mask difficulty assessment: 1 - vent by mask    Final Airway Details  Final airway type: endotracheal airway      Successful airway: ETT  Cuffed: yes   Successful intubation technique: direct laryngoscopy  Endotracheal tube insertion site: oral  Blade: Ba  Blade size: 2  ETT size (mm): 7.0  Cormack-Lehane Classification: grade I - full view of glottis  Placement verified by: chest auscultation and capnometry   Cuff volume (mL): 5  Measured from: gums  Number of attempts at approach: 1  Assessment: lips, teeth, and gum same as pre-op and atraumatic intubation

## 2021-08-27 NOTE — ANESTHESIA POSTPROCEDURE EVALUATION
Patient: Madia Delacruz    Procedure Summary     Date: 08/27/21 Room / Location: [facility] OR  /  [facility] OR    Anesthesia Start: 1220 Anesthesia Stop: 1321    Procedure: SALPINGECTOMY LAPAROSCOPIC for sterilization (Bilateral Abdomen) Diagnosis:       Consultation for female sterilization      (Consultation for female sterilization [Z30.09])    Surgeons: Yogesh Jean MD Provider: Sekou Richter CRNA    Anesthesia Type: general ASA Status: 3          Anesthesia Type: general    Vitals  Vitals Value Taken Time   BP 94/51 08/27/21 1405   Temp 97.8 °F (36.6 °C) 08/27/21 1405   Pulse 62 08/27/21 1405   Resp 14 08/27/21 1405   SpO2 94 % 08/27/21 1405           Post Anesthesia Care and Evaluation    Patient location during evaluation: PACU  Patient participation: complete - patient participated  Level of consciousness: awake and alert  Pain management: adequate  Airway patency: patent  Anesthetic complications: No anesthetic complications  PONV Status: none  Cardiovascular status: acceptable and hemodynamically stable  Respiratory status: acceptable  Hydration status: acceptable    Comments: Blood pressure 106/53, pulse 72, temperature 97.8 °F (36.6 °C), temperature source Temporal, resp. rate 18, last menstrual period 07/30/2021, SpO2 99 %, not currently breastfeeding.    Patient discharged from PACU based upon Eva score. Please see RN notes for further details

## 2021-08-27 NOTE — DISCHARGE INSTRUCTIONS

## 2021-08-27 NOTE — ANESTHESIA PREPROCEDURE EVALUATION
Anesthesia Evaluation     Patient summary reviewed   history of anesthetic complications: PONV  NPO Solid Status: > 8 hours             Airway   Mallampati: I  TM distance: >3 FB  Neck ROM: full  Dental      Pulmonary    (+) a smoker,   (-) COPD, asthma, sleep apnea  Cardiovascular   Exercise tolerance: excellent (>7 METS)    (-) pacemaker, past MI, angina, cardiac stents      Neuro/Psych  (-) seizures, TIA, CVA  GI/Hepatic/Renal/Endo    (+) morbid obesity,    (-) GERD, liver disease, no renal disease, diabetes    Musculoskeletal     Abdominal    Substance History      OB/GYN          Other                        Anesthesia Plan    ASA 3     general     intravenous induction     Anesthetic plan, all risks, benefits, and alternatives have been provided, discussed and informed consent has been obtained with: patient.

## 2021-08-27 NOTE — OP NOTE
[facility] Cory eDlacruz  : 1995  MRN: [id number]  CSN: [id number]  Date: 2021    Operative Note    SALPINGECTOMY LAPAROSCOPIC      Pre-op Diagnosis:  Consultation for female sterilization [Z30.09]   Post-op Diagnosis:  Post-Op Diagnosis Codes:     * Consultation for female sterilization [Z30.09]   Procedure: Procedure(s):  SALPINGECTOMY LAPAROSCOPIC for sterilization   Surgeon: Surgeon(s):  Yogesh Jean MD       Anesthesia: General by LIBAN Richter CRNA     Estimated Blood Loss:  None   mls   Fluids:  500   mls   UOP:  200 and clear   mls   Drains:  None   ABx:  Ancef 2 g     Specimens:   Bilateral fallopian tubes   Findings:  Normal pelvic anatomy   Complications:  None     INDICATION: Maida Delacruz is a 26 y.o. female with undesired fertility.  She is aware of reversible alternatives and desires surgical sterilization.  PROCEDURE IN DETAIL: After informed consent was obtained, the patient was taken to the operating room, where general anesthesia was administered. She was placed in the lithotomy position in [facility], and her abdomen perineum and vagina were prepped and draped in normal sterile fashion. An open sided speculum was placed in the vagina and her bladder drained with a red rubber catheter. The anterior lip of the cervix was visualized and grasped with a single toothed tenaculum. An acorn tipped uterine manipulator was attached to the cervix and tenaculum.  The speculum was removed.  After changing gloves attention was turned to the abdomen.   The infraumbilical skin was made in the umbilicus with a scalpel.  The subcutaneous tissues were divided bluntly.  The abdominal wall was elevated and a Veress needle placed into the peritoneal cavity on the first attempt.  Correct placement was confirmed by measurement of gas pressures and flow as well as water drop test.  Opening pressure was 4 mmHg.  The abdomen was insufflated with carbon dioxide gas and the Veress needle removed.  A  5 mm blunt tissue  trocar was placed into the abdominal cavity while elevating the abdominal wall. Correct placement was confirmed by visualization with the laparoscope.  The abdomen and pelvis were inspected and there was no sign of injury.  An additional 5 mm trochars placed in the suprapubic area under direct visualization and an 8 mm trocar placed in the left lower quadrant under direct visualization without difficulty or complication.  The patient was placed in Trendelenburg position and the bowel swept out of the pelvis with a blunt probe.  The uterus was elevated and both tubes identified all the way to their fimbriated end.  The harmonic scalpel was used to dissect through the mesosalpinx from the fimbria all the way to the cornual regions where the tubes were truncated.  Hemostasis was noted.  This was performed bilaterally.  Specimens were removed through the 8 mm trocar site.  Areas were inspected noted be hemostatic.  Instruments were removed.  Insufflation was released.  Trochars were removed.  Skin incisions closed with a subcuticular stitch of 3-0 Vicryl Rapide.  A sterile dressing was applied.  The instruments were then removed from the vagina and the tenaculum site was noted to be hemostatic.   The patient was then awakened and taken to the recovery room in stable condition.  She tolerated the procedure well and without complications.  All sponge needle and instrument counts were correct times 3 per the OR staff.    Yogesh Jean MD   8/27/2021  13:10 CDT

## 2021-08-31 RX ORDER — FENTANYL CITRATE 50 UG/ML
INJECTION, SOLUTION INTRAMUSCULAR; INTRAVENOUS AS NEEDED
Status: DISCONTINUED | OUTPATIENT
Start: 2021-08-27 | End: 2021-08-31 | Stop reason: SURG

## 2021-09-02 LAB
CYTO UR: NORMAL
LAB AP CASE REPORT: NORMAL
PATH REPORT.FINAL DX SPEC: NORMAL
PATH REPORT.GROSS SPEC: NORMAL

## (undated) DEVICE — 3M™ STERI-STRIP™ REINFORCED ADHESIVE SKIN CLOSURES, R1546, 1/4 IN X 4 IN (6 MM X 100 MM), 10 STRIPS/ENVELOPE: Brand: 3M™ STERI-STRIP™

## (undated) DEVICE — ENDOPATH XCEL WITH OPTIVIEW TECHNOLOGY UNIVERSAL TROCAR STABILITY SLEEVES: Brand: ENDOPATH XCEL OPTIVIEW

## (undated) DEVICE — SUT MNCRYL 4/0 PS2 27IN UD MCP426H

## (undated) DEVICE — PK LAP GYN 30

## (undated) DEVICE — SPNG GZ 2S 2X2 8PLY STRL PK/2

## (undated) DEVICE — PK TURNOVER RM ADV

## (undated) DEVICE — ENDOPATH XCEL WITH OPTIVIEW TECHNOLOGY BLADELESS TROCARS WITH STABILITY SLEEVES: Brand: ENDOPATH XCEL OPTIVIEW

## (undated) DEVICE — ADHS LIQ MASTISOL 2/3ML

## (undated) DEVICE — GLV SURG TRIUMPH MICRO PF LTX 8 STRL

## (undated) DEVICE — ST TBG PNEUMOCLEAR EVAC SMOKE HIFLO

## (undated) DEVICE — GLV SURG SENSICARE W/ALOE PF LF 7.5 STRL

## (undated) DEVICE — DRSNG SURESITE WNDW 2.38X2.75

## (undated) DEVICE — CLTH CLENS READYCLEANSE PERI CARE PK/5

## (undated) DEVICE — UTILITY MARKER W/MED LABELS: Brand: MEDLINE